# Patient Record
Sex: MALE | Race: WHITE | NOT HISPANIC OR LATINO | Employment: FULL TIME | URBAN - METROPOLITAN AREA
[De-identification: names, ages, dates, MRNs, and addresses within clinical notes are randomized per-mention and may not be internally consistent; named-entity substitution may affect disease eponyms.]

---

## 2020-12-03 ENCOUNTER — HOSPITAL ENCOUNTER (EMERGENCY)
Facility: HOSPITAL | Age: 30
Discharge: HOME | End: 2020-12-03
Attending: EMERGENCY MEDICINE | Admitting: EMERGENCY MEDICINE
Payer: COMMERCIAL

## 2020-12-03 ENCOUNTER — APPOINTMENT (EMERGENCY)
Dept: RADIOLOGY | Facility: HOSPITAL | Age: 30
End: 2020-12-03
Payer: COMMERCIAL

## 2020-12-03 VITALS
HEART RATE: 62 BPM | TEMPERATURE: 98.3 F | HEIGHT: 72 IN | WEIGHT: 180 LBS | BODY MASS INDEX: 24.38 KG/M2 | OXYGEN SATURATION: 96 % | RESPIRATION RATE: 16 BRPM | SYSTOLIC BLOOD PRESSURE: 125 MMHG | DIASTOLIC BLOOD PRESSURE: 70 MMHG

## 2020-12-03 DIAGNOSIS — R06.02 SHORTNESS OF BREATH: ICD-10-CM

## 2020-12-03 DIAGNOSIS — R53.83 FATIGUE: ICD-10-CM

## 2020-12-03 DIAGNOSIS — Z20.822 ENCOUNTER FOR LABORATORY TESTING FOR COVID-19 VIRUS: Primary | ICD-10-CM

## 2020-12-03 LAB
ALBUMIN SERPL BCP-MCNC: 4 G/DL (ref 3.5–5)
ALP SERPL-CCNC: 59 U/L (ref 46–116)
ALT SERPL W P-5'-P-CCNC: 44 U/L (ref 12–78)
ANION GAP SERPL CALCULATED.3IONS-SCNC: 4 MMOL/L (ref 4–13)
AST SERPL W P-5'-P-CCNC: 21 U/L (ref 5–45)
BASOPHILS # BLD AUTO: 0.11 THOUSANDS/ΜL (ref 0–0.1)
BASOPHILS NFR BLD AUTO: 1 % (ref 0–1)
BILIRUB SERPL-MCNC: 0.25 MG/DL (ref 0.2–1)
BUN SERPL-MCNC: 12 MG/DL (ref 5–25)
CALCIUM SERPL-MCNC: 9.2 MG/DL (ref 8.3–10.1)
CHLORIDE SERPL-SCNC: 105 MMOL/L (ref 100–108)
CK SERPL-CCNC: 128 U/L (ref 39–308)
CO2 SERPL-SCNC: 29 MMOL/L (ref 21–32)
CREAT SERPL-MCNC: 0.83 MG/DL (ref 0.6–1.3)
EOSINOPHIL # BLD AUTO: 0.36 THOUSAND/ΜL (ref 0–0.61)
EOSINOPHIL NFR BLD AUTO: 4 % (ref 0–6)
ERYTHROCYTE [DISTWIDTH] IN BLOOD BY AUTOMATED COUNT: 12.6 % (ref 11.6–15.1)
GFR SERPL CREATININE-BSD FRML MDRD: 117 ML/MIN/1.73SQ M
GLUCOSE SERPL-MCNC: 95 MG/DL (ref 65–140)
HCT VFR BLD AUTO: 46.2 % (ref 36.5–49.3)
HGB BLD-MCNC: 14.4 G/DL (ref 12–17)
IMM GRANULOCYTES # BLD AUTO: 0.03 THOUSAND/UL (ref 0–0.2)
IMM GRANULOCYTES NFR BLD AUTO: 0 % (ref 0–2)
LYMPHOCYTES # BLD AUTO: 1.44 THOUSANDS/ΜL (ref 0.6–4.47)
LYMPHOCYTES NFR BLD AUTO: 15 % (ref 14–44)
MAGNESIUM SERPL-MCNC: 2.5 MG/DL (ref 1.6–2.6)
MCH RBC QN AUTO: 29.2 PG (ref 26.8–34.3)
MCHC RBC AUTO-ENTMCNC: 31.2 G/DL (ref 31.4–37.4)
MCV RBC AUTO: 94 FL (ref 82–98)
MONOCYTES # BLD AUTO: 0.66 THOUSAND/ΜL (ref 0.17–1.22)
MONOCYTES NFR BLD AUTO: 7 % (ref 4–12)
NEUTROPHILS # BLD AUTO: 6.82 THOUSANDS/ΜL (ref 1.85–7.62)
NEUTS SEG NFR BLD AUTO: 73 % (ref 43–75)
NRBC BLD AUTO-RTO: 0 /100 WBCS
PHOSPHATE SERPL-MCNC: 3.1 MG/DL (ref 2.7–4.5)
PLATELET # BLD AUTO: 198 THOUSANDS/UL (ref 149–390)
PMV BLD AUTO: 11 FL (ref 8.9–12.7)
POTASSIUM SERPL-SCNC: 4.2 MMOL/L (ref 3.5–5.3)
PROT SERPL-MCNC: 6.9 G/DL (ref 6.4–8.2)
RBC # BLD AUTO: 4.93 MILLION/UL (ref 3.88–5.62)
SODIUM SERPL-SCNC: 138 MMOL/L (ref 136–145)
TROPONIN I SERPL-MCNC: <0.02 NG/ML
WBC # BLD AUTO: 9.42 THOUSAND/UL (ref 4.31–10.16)

## 2020-12-03 PROCEDURE — 99285 EMERGENCY DEPT VISIT HI MDM: CPT

## 2020-12-03 PROCEDURE — 82550 ASSAY OF CK (CPK): CPT | Performed by: EMERGENCY MEDICINE

## 2020-12-03 PROCEDURE — 84100 ASSAY OF PHOSPHORUS: CPT | Performed by: EMERGENCY MEDICINE

## 2020-12-03 PROCEDURE — 85025 COMPLETE CBC W/AUTO DIFF WBC: CPT | Performed by: EMERGENCY MEDICINE

## 2020-12-03 PROCEDURE — 87637 SARSCOV2&INF A&B&RSV AMP PRB: CPT | Performed by: EMERGENCY MEDICINE

## 2020-12-03 PROCEDURE — 99285 EMERGENCY DEPT VISIT HI MDM: CPT | Performed by: EMERGENCY MEDICINE

## 2020-12-03 PROCEDURE — 71045 X-RAY EXAM CHEST 1 VIEW: CPT

## 2020-12-03 PROCEDURE — 83735 ASSAY OF MAGNESIUM: CPT | Performed by: EMERGENCY MEDICINE

## 2020-12-03 PROCEDURE — 80053 COMPREHEN METABOLIC PANEL: CPT | Performed by: EMERGENCY MEDICINE

## 2020-12-03 PROCEDURE — 84484 ASSAY OF TROPONIN QUANT: CPT | Performed by: EMERGENCY MEDICINE

## 2020-12-03 PROCEDURE — 93005 ELECTROCARDIOGRAM TRACING: CPT

## 2020-12-03 PROCEDURE — 36415 COLL VENOUS BLD VENIPUNCTURE: CPT | Performed by: EMERGENCY MEDICINE

## 2020-12-03 RX ORDER — IBUPROFEN 600 MG/1
600 TABLET ORAL ONCE
Status: COMPLETED | OUTPATIENT
Start: 2020-12-03 | End: 2020-12-03

## 2020-12-03 RX ADMIN — IBUPROFEN 600 MG: 600 TABLET, FILM COATED ORAL at 21:53

## 2020-12-04 LAB
ATRIAL RATE: 63 BPM
P AXIS: 61 DEGREES
PR INTERVAL: 170 MS
QRS AXIS: 61 DEGREES
QRSD INTERVAL: 84 MS
QT INTERVAL: 390 MS
QTC INTERVAL: 399 MS
T WAVE AXIS: 4 DEGREES
VENTRICULAR RATE: 63 BPM

## 2020-12-04 PROCEDURE — 93010 ELECTROCARDIOGRAM REPORT: CPT | Performed by: INTERNAL MEDICINE

## 2020-12-08 LAB
FLUAV RNA NPH QL NAA+PROBE: NOT DETECTED
FLUBV RNA NPH QL NAA+PROBE: NOT DETECTED
RSV RNA NPH QL NAA+PROBE: NOT DETECTED
SARS-COV-2 RNA NPH QL NAA+PROBE: NOT DETECTED

## 2021-12-16 ENCOUNTER — IMMUNIZATIONS (OUTPATIENT)
Dept: FAMILY MEDICINE CLINIC | Facility: HOSPITAL | Age: 31
End: 2021-12-16

## 2021-12-16 DIAGNOSIS — Z23 ENCOUNTER FOR IMMUNIZATION: Primary | ICD-10-CM

## 2021-12-16 PROCEDURE — 91300 COVID-19 PFIZER VACC 0.3 ML: CPT

## 2021-12-16 PROCEDURE — 0001A COVID-19 PFIZER VACC 0.3 ML: CPT

## 2022-05-23 ENCOUNTER — APPOINTMENT (EMERGENCY)
Dept: RADIOLOGY | Facility: HOSPITAL | Age: 32
End: 2022-05-23
Payer: COMMERCIAL

## 2022-05-23 ENCOUNTER — HOSPITAL ENCOUNTER (EMERGENCY)
Facility: HOSPITAL | Age: 32
Discharge: HOME | End: 2022-05-24
Attending: EMERGENCY MEDICINE
Payer: COMMERCIAL

## 2022-05-23 VITALS
SYSTOLIC BLOOD PRESSURE: 114 MMHG | OXYGEN SATURATION: 99 % | HEART RATE: 74 BPM | TEMPERATURE: 98.5 F | RESPIRATION RATE: 19 BRPM | DIASTOLIC BLOOD PRESSURE: 80 MMHG

## 2022-05-23 DIAGNOSIS — R10.9 ABDOMINAL PAIN: Primary | ICD-10-CM

## 2022-05-23 DIAGNOSIS — N20.0 RIGHT RENAL STONE: ICD-10-CM

## 2022-05-23 DIAGNOSIS — R11.2 NAUSEA AND VOMITING: ICD-10-CM

## 2022-05-23 LAB
ALBUMIN SERPL BCP-MCNC: 4.2 G/DL (ref 3.5–5)
ALP SERPL-CCNC: 61 U/L (ref 46–116)
ALT SERPL W P-5'-P-CCNC: 34 U/L (ref 12–78)
ANION GAP SERPL CALCULATED.3IONS-SCNC: 1 MMOL/L (ref 4–13)
AST SERPL W P-5'-P-CCNC: 22 U/L (ref 5–45)
ATRIAL RATE: 76 BPM
BASOPHILS # BLD AUTO: 0.11 THOUSANDS/ΜL (ref 0–0.1)
BASOPHILS NFR BLD AUTO: 1 % (ref 0–1)
BILIRUB SERPL-MCNC: 0.37 MG/DL (ref 0.2–1)
BUN SERPL-MCNC: 13 MG/DL (ref 5–25)
CALCIUM SERPL-MCNC: 9.9 MG/DL (ref 8.3–10.1)
CHLORIDE SERPL-SCNC: 101 MMOL/L (ref 100–108)
CO2 SERPL-SCNC: 34 MMOL/L (ref 21–32)
CREAT SERPL-MCNC: 1.06 MG/DL (ref 0.6–1.3)
EOSINOPHIL # BLD AUTO: 0.3 THOUSAND/ΜL (ref 0–0.61)
EOSINOPHIL NFR BLD AUTO: 3 % (ref 0–6)
ERYTHROCYTE [DISTWIDTH] IN BLOOD BY AUTOMATED COUNT: 12.7 % (ref 11.6–15.1)
GFR SERPL CREATININE-BSD FRML MDRD: 91 ML/MIN/1.73SQ M
GLUCOSE SERPL-MCNC: 107 MG/DL (ref 65–140)
HCT VFR BLD AUTO: 50.1 % (ref 36.5–49.3)
HGB BLD-MCNC: 15.7 G/DL (ref 12–17)
IMM GRANULOCYTES # BLD AUTO: 0.04 THOUSAND/UL (ref 0–0.2)
IMM GRANULOCYTES NFR BLD AUTO: 0 % (ref 0–2)
LIPASE SERPL-CCNC: 67 U/L (ref 73–393)
LYMPHOCYTES # BLD AUTO: 1.17 THOUSANDS/ΜL (ref 0.6–4.47)
LYMPHOCYTES NFR BLD AUTO: 11 % (ref 14–44)
MCH RBC QN AUTO: 28.6 PG (ref 26.8–34.3)
MCHC RBC AUTO-ENTMCNC: 31.3 G/DL (ref 31.4–37.4)
MCV RBC AUTO: 91 FL (ref 82–98)
MONOCYTES # BLD AUTO: 0.7 THOUSAND/ΜL (ref 0.17–1.22)
MONOCYTES NFR BLD AUTO: 7 % (ref 4–12)
NEUTROPHILS # BLD AUTO: 8.25 THOUSANDS/ΜL (ref 1.85–7.62)
NEUTS SEG NFR BLD AUTO: 78 % (ref 43–75)
NRBC BLD AUTO-RTO: 0 /100 WBCS
P AXIS: 57 DEGREES
PLATELET # BLD AUTO: 229 THOUSANDS/UL (ref 149–390)
PMV BLD AUTO: 11.5 FL (ref 8.9–12.7)
POTASSIUM SERPL-SCNC: 4.1 MMOL/L (ref 3.5–5.3)
PR INTERVAL: 168 MS
PROT SERPL-MCNC: 7.7 G/DL (ref 6.4–8.2)
QRS AXIS: 23 DEGREES
QRSD INTERVAL: 78 MS
QT INTERVAL: 352 MS
QTC INTERVAL: 396 MS
RBC # BLD AUTO: 5.49 MILLION/UL (ref 3.88–5.62)
SODIUM SERPL-SCNC: 136 MMOL/L (ref 136–145)
T WAVE AXIS: 18 DEGREES
VENTRICULAR RATE: 76 BPM
WBC # BLD AUTO: 10.57 THOUSAND/UL (ref 4.31–10.16)

## 2022-05-23 PROCEDURE — 80053 COMPREHEN METABOLIC PANEL: CPT | Performed by: EMERGENCY MEDICINE

## 2022-05-23 PROCEDURE — 99285 EMERGENCY DEPT VISIT HI MDM: CPT | Performed by: EMERGENCY MEDICINE

## 2022-05-23 PROCEDURE — 83690 ASSAY OF LIPASE: CPT | Performed by: EMERGENCY MEDICINE

## 2022-05-23 PROCEDURE — 96375 TX/PRO/DX INJ NEW DRUG ADDON: CPT

## 2022-05-23 PROCEDURE — G1004 CDSM NDSC: HCPCS

## 2022-05-23 PROCEDURE — 36415 COLL VENOUS BLD VENIPUNCTURE: CPT | Performed by: EMERGENCY MEDICINE

## 2022-05-23 PROCEDURE — 96374 THER/PROPH/DIAG INJ IV PUSH: CPT

## 2022-05-23 PROCEDURE — 93010 ELECTROCARDIOGRAM REPORT: CPT | Performed by: INTERNAL MEDICINE

## 2022-05-23 PROCEDURE — 87636 SARSCOV2 & INF A&B AMP PRB: CPT | Performed by: EMERGENCY MEDICINE

## 2022-05-23 PROCEDURE — 93005 ELECTROCARDIOGRAM TRACING: CPT

## 2022-05-23 PROCEDURE — 99284 EMERGENCY DEPT VISIT MOD MDM: CPT

## 2022-05-23 PROCEDURE — 96361 HYDRATE IV INFUSION ADD-ON: CPT

## 2022-05-23 PROCEDURE — 85025 COMPLETE CBC W/AUTO DIFF WBC: CPT | Performed by: EMERGENCY MEDICINE

## 2022-05-23 PROCEDURE — 74176 CT ABD & PELVIS W/O CONTRAST: CPT

## 2022-05-23 RX ORDER — DICYCLOMINE HCL 20 MG
20 TABLET ORAL ONCE
Status: COMPLETED | OUTPATIENT
Start: 2022-05-23 | End: 2022-05-23

## 2022-05-23 RX ORDER — KETOROLAC TROMETHAMINE 30 MG/ML
15 INJECTION, SOLUTION INTRAMUSCULAR; INTRAVENOUS ONCE
Status: COMPLETED | OUTPATIENT
Start: 2022-05-23 | End: 2022-05-23

## 2022-05-23 RX ORDER — ONDANSETRON 2 MG/ML
4 INJECTION INTRAMUSCULAR; INTRAVENOUS ONCE
Status: COMPLETED | OUTPATIENT
Start: 2022-05-23 | End: 2022-05-23

## 2022-05-23 RX ADMIN — KETOROLAC TROMETHAMINE 15 MG: 30 INJECTION, SOLUTION INTRAMUSCULAR at 20:12

## 2022-05-23 RX ADMIN — DICYCLOMINE HYDROCHLORIDE 20 MG: 20 TABLET ORAL at 22:33

## 2022-05-23 RX ADMIN — SODIUM CHLORIDE 1000 ML: 0.9 INJECTION, SOLUTION INTRAVENOUS at 20:13

## 2022-05-23 RX ADMIN — ONDANSETRON 4 MG: 2 INJECTION INTRAMUSCULAR; INTRAVENOUS at 20:12

## 2022-05-23 NOTE — ED PROVIDER NOTES
History  Chief Complaint   Patient presents with    Abdominal Pain     Upper and lower abd pain started 5 days ago  Today started with vomiting x2  No diarrhea      35 y/o male presents to the ER for evaluation of abdominal pain with nausea and vomiting  The patient states that his symptoms started 4-5 days ago with epigastric pain that was initially intermittent, however over the last 2-3 days the pain has become more constant and is now periumbilical as well  He also reports nausea over the last 2 days and today experienced 2 episodes of nonbloody, non bilious emesis  His pain does not change with oral intake  No fever, chills, cough, shortness of breath, chest pain, diarrhea, or urinary symptoms  No history of prior abdominal diseases  None       Past Medical History:   Diagnosis Date    Anxiety        History reviewed  No pertinent surgical history  History reviewed  No pertinent family history  I have reviewed and agree with the history as documented  E-Cigarette/Vaping     E-Cigarette/Vaping Substances     Social History     Tobacco Use    Smoking status: Never Smoker    Smokeless tobacco: Never Used   Substance Use Topics    Alcohol use: Yes    Drug use: Never        Review of Systems   Constitutional: Negative for chills and fever  HENT: Negative for congestion, rhinorrhea and sore throat  Respiratory: Negative for cough and shortness of breath  Cardiovascular: Negative for chest pain and palpitations  Gastrointestinal: Positive for abdominal pain, nausea and vomiting  Negative for diarrhea  Genitourinary: Negative for dysuria and hematuria  Musculoskeletal: Negative for back pain and neck pain  Neurological: Negative for weakness, light-headedness, numbness and headaches  All other systems reviewed and are negative        Physical Exam  ED Triage Vitals [05/23/22 1843]   Temperature Pulse Respirations Blood Pressure SpO2   98 5 °F (36 9 °C) 90 16 (!) 172/96 100 % Temp Source Heart Rate Source Patient Position - Orthostatic VS BP Location FiO2 (%)   Temporal Monitor Sitting Left arm --      Pain Score       8             Orthostatic Vital Signs  Vitals:    05/23/22 2100 05/23/22 2130 05/23/22 2200 05/23/22 2230   BP: 119/57 112/61 113/59 114/80   Pulse: 76 72 70 74   Patient Position - Orthostatic VS: Lying Sitting Sitting Sitting       Physical Exam  Vitals and nursing note reviewed  Constitutional:       General: He is not in acute distress  Appearance: Normal appearance  He is well-developed and normal weight  He is not ill-appearing  HENT:      Head: Normocephalic and atraumatic  Right Ear: External ear normal       Left Ear: External ear normal       Nose: Nose normal       Mouth/Throat:      Mouth: Mucous membranes are moist       Pharynx: Oropharynx is clear  No oropharyngeal exudate or posterior oropharyngeal erythema  Eyes:      Extraocular Movements: Extraocular movements intact  Conjunctiva/sclera: Conjunctivae normal       Pupils: Pupils are equal, round, and reactive to light  Cardiovascular:      Rate and Rhythm: Normal rate and regular rhythm  Pulses: Normal pulses  Heart sounds: Normal heart sounds  Pulmonary:      Effort: Pulmonary effort is normal  No respiratory distress  Breath sounds: Normal breath sounds  No wheezing or rales  Abdominal:      General: Abdomen is flat  Bowel sounds are normal  There is no distension  Palpations: Abdomen is soft  Tenderness: There is generalized abdominal tenderness  There is right CVA tenderness and left CVA tenderness  There is no guarding  Comments: Mild generalized abdominal tenderness and mild bilateral CVA tenderness on exam    Musculoskeletal:         General: No swelling or tenderness  Normal range of motion  Cervical back: Normal range of motion and neck supple  No tenderness  Skin:     General: Skin is warm and dry     Neurological:      General: No focal deficit present  Mental Status: He is alert and oriented to person, place, and time           ED Medications  Medications   ondansetron (ZOFRAN) injection 4 mg (4 mg Intravenous Given 5/23/22 2012)   sodium chloride 0 9 % bolus 1,000 mL (0 mL Intravenous Stopped 5/23/22 2119)   ketorolac (TORADOL) injection 15 mg (15 mg Intravenous Given 5/23/22 2012)   dicyclomine (BENTYL) tablet 20 mg (20 mg Oral Given 5/23/22 2233)       Diagnostic Studies  Results Reviewed     Procedure Component Value Units Date/Time    Comprehensive metabolic panel [[id number]]  (Abnormal) Collected: 05/23/22 2012    Lab Status: Final result Specimen: Blood from Arm, Right Updated: 05/23/22 2046     Sodium 136 mmol/L      Potassium 4 1 mmol/L      Chloride 101 mmol/L      CO2 34 mmol/L      ANION GAP 1 mmol/L      BUN 13 mg/dL      Creatinine 1 06 mg/dL      Glucose 107 mg/dL      Calcium 9 9 mg/dL      AST 22 U/L      ALT 34 U/L      Alkaline Phosphatase 61 U/L      Total Protein 7 7 g/dL      Albumin 4 2 g/dL      Total Bilirubin 0 37 mg/dL      eGFR 91 ml/min/1 73sq m     Narrative:      [place] guidelines for Chronic Kidney Disease (CKD):     Stage 1 with normal or high GFR (GFR > 90 mL/min/1 73 square meters)    Stage 2 Mild CKD (GFR = 60-89 mL/min/1 73 square meters)    Stage 3A Moderate CKD (GFR = 45-59 mL/min/1 73 square meters)    Stage 3B Moderate CKD (GFR = 30-44 mL/min/1 73 square meters)    Stage 4 Severe CKD (GFR = 15-29 mL/min/1 73 square meters)    Stage 5 End Stage CKD (GFR <15 mL/min/1 73 square meters)  Note: GFR calculation is accurate only with a steady state creatinine    Lipase [[id number]]  (Abnormal) Collected: 05/23/22 2012    Lab Status: Final result Specimen: Blood from Arm, Right Updated: 05/23/22 2045     Lipase 67 u/L     CBC and differential [[id number]]  (Abnormal) Collected: 05/23/22 2012    Lab Status: Final result Specimen: Blood from Arm, Right Updated: 05/23/22 2026     WBC 10 57 Thousand/uL      RBC 5 49 Million/uL      Hemoglobin 15 7 g/dL      Hematocrit 50 1 %      MCV 91 fL      MCH 28 6 pg      MCHC 31 3 g/dL      RDW 12 7 %      MPV 11 5 fL      Platelets 730 Thousands/uL      nRBC 0 /100 WBCs      Neutrophils Relative 78 %      Immat GRANS % 0 %      Lymphocytes Relative 11 %      Monocytes Relative 7 %      Eosinophils Relative 3 %      Basophils Relative 1 %      Neutrophils Absolute 8 25 Thousands/µL      Immature Grans Absolute 0 04 Thousand/uL      Lymphocytes Absolute 1 17 Thousands/µL      Monocytes Absolute 0 70 Thousand/µL      Eosinophils Absolute 0 30 Thousand/µL      Basophils Absolute 0 11 Thousands/µL     COVID/FLU - 24 hour TAT [[id number]] Collected: 05/23/22 2012    Lab Status: In process Specimen: Nares from Nose Updated: 05/23/22 2021                 CT abdomen pelvis wo contrast   Final Result by Scar Rock DO (05/23 2324)      Tiny nonobstructing stone in the lower pole the right kidney  No hydronephrosis  No acute process seen  Workstation performed: [id number]               Procedures  Procedures      ED Course  ED Course as of 05/24/22 0054   Mon May 23, 2022   2015 Procedure Note: EKG  Date/Time: 05/23/22 8:08 PM   Interpreted by: Marci Mccormack MD  Indications / Diagnosis:  Epigastric pain  ECG reviewed by me, the ED Physician: yes   The EKG demonstrates:  Rhythm: normal sinus 76 bpm  Intervals: normal intervals  Axis: normal axis  QRS/Blocks: low voltage QRS  ST Changes: No acute ST Changes, no STD/EDWIGE  No significant change compared to ECG from 12/03/2020    2349 CT abdomen pelvis wo contrast                                       MDM  Number of Diagnoses or Management Options  Abdominal pain  Nausea and vomiting  Right renal stone  Diagnosis management comments: This is a 33 y/o male presenting for evaluation of abdominal pain with nausea and vomiting    The patient states that his symptoms started 4-5 days ago with epigastric pain that was initially intermittent, however over the last 2-3 days the pain has become more constant and is now periumbilical as well  He also reports nausea over the last 2 days and today experienced 2 episodes of nonbloody, non bilious emesis  His pain does not change with oral intake  No fever, diarrhea, or urinary symptoms  On exam the patient is afebrile, VSS, appears uncomfortable but no acute distress  Heart with regular rate and rhythm, lungs are clear to auscultation bilaterally  Abdomen is soft with mild generalized tenderness as well as mild bilateral CVA tenderness  Will check CBC, CMP, lipase, and CTA/P  Labs reassuring  Viral testing also performed for COVID/flu, results are pending  CT showed tiny nonobstructing right renal calculus in the inferior pole  Suspect viral GI illness  Symptomatic treatment  Discussed all findings, treatment, red flags/return precautions, and outpatient follow-up and the patient/family understands and agrees  Stable for discharge  Disposition  Final diagnoses:   Abdominal pain   Nausea and vomiting   Right renal stone     Time reflects when diagnosis was documented in both MDM as applicable and the Disposition within this note     Time User Action Codes Description Comment    5/24/2022 12:00 AM Shelizzettea Mascot Add [R10 9] Abdominal pain     5/24/2022 12:00 AM Sheilda Mascot Add [R11 2] Nausea and vomiting     5/24/2022 12:02 AM Sheilda Mascot Add [N20 0] Right renal stone       ED Disposition     ED Disposition   Discharge    Condition   Stable    Date/Time   Tue May 24, 2022 12:01 AM    Comment   Mini Summers discharge to home/self care                 Follow-up Information     Follow up With Specialties Details Why Contact Info Additional 94 [facility] Internal Medicine Call  As needed [address] [facility] [address]  [facility] [address], [address] [facility] [address], East, [place], [place], [address] [phone]    Infolink  Call  As needed [address] Emergency Department Emergency Medicine Go to  If symptoms worsen [address]  [address] [facility] [address] East Emergency Department, 600 [facility] 20, [place], [place], [address]    Florentin Pink [facility] [place] [facility] Call  As needed [address] [facility] [address] [facility] [address]  Cecilio Felix [address] [facility] [place], [address] [place] [address] [address], [place], [place], [address]          Discharge Medication List as of 5/24/2022 12:07 AM      START taking these medications    Details   ondansetron (ZOFRAN-ODT) 8 mg disintegrating tablet Take 1 tablet (8 mg total) by mouth every 8 (eight) hours as needed for nausea or vomiting, Starting Tue 5/24/2022, Normal      sucralfate (CARAFATE) 1 g tablet Take 1 tablet (1 g total) by mouth as needed in the morning and 1 tablet (1 g total) as needed at noon and 1 tablet (1 g total) as needed in the evening and 1 tablet (1 g total) as needed before bedtime (Abdominal pain)  Do all this for up to 10 days  , S tarting Tue 5/24/2022, Until Fri 6/3/2022 at 2359, Normal           No discharge procedures on file  PDMP Review     None           ED Provider  Attending physically available and evaluated [employer]  I managed the patient along with the ED Attending      Electronically Signed by         Joanna Jeong MD  05/24/22 5957

## 2022-05-24 LAB
FLUAV RNA RESP QL NAA+PROBE: NEGATIVE
FLUBV RNA RESP QL NAA+PROBE: NEGATIVE
SARS-COV-2 RNA RESP QL NAA+PROBE: NEGATIVE

## 2022-05-24 RX ORDER — ONDANSETRON 8 MG/1
8 TABLET, ORALLY DISINTEGRATING ORAL EVERY 8 HOURS PRN
Qty: 20 TABLET | Refills: 0 | Status: SHIPPED | OUTPATIENT
Start: 2022-05-24

## 2022-05-24 RX ORDER — SUCRALFATE 1 G/1
1 TABLET ORAL 4 TIMES DAILY PRN
Qty: 40 TABLET | Refills: 0 | Status: SHIPPED | OUTPATIENT
Start: 2022-05-24 | End: 2022-06-03

## 2022-05-24 NOTE — ED ATTENDING ATTESTATION
Final Diagnosis:  1  Abdominal pain    2  Nausea and vomiting    3  Right renal stone           Moy SANDVOAL MD, saw and evaluated the patient  All available labs and X-rays were ordered by me or the resident and have been reviewed by myself  I discussed the patient with the resident / non-physician and agree with the resident's / non-physician practitioner's findings and plan as documented in the resident's / non-physician practicitioner's note, except where noted  At this point, I agree with the current assessment done in the ED  I was present during key portions of all procedures performed unless otherwise stated  Chief Complaint   Patient presents with    Abdominal Pain     Upper and lower abd pain started 5 days ago  Today started with vomiting x2  No diarrhea      This is a 34 y o  male presenting for evaluation of abdominal pain  Patient states that beginning for 5 days ago he started to have this discomfort any mid epigastric region, no associated fevers chills nausea vomiting chest pain shortness of breath  No change in stool, denies any melanotic stools or black colored stools  Denies any dizziness or lightheadedness  Denies history of similar  Does take Motrin to help with symptoms and uses Motrin daily, about 400 mg q day altogether  Also tried Pepto-Bismol to help symptoms  Denies any urinary symptoms including burning itching pain blood frequency  No cough congestion  He has maybe 2 beers a week, does not drink regularly, no history of pancreatitis  Denies any belly surgeries, only having a patent ductus arteriosus surgery at some point  Denies any major medical problems apart from reflux, no recent endoscopy  Denies smoking or drug use  No recent travel  No sick contacts  No history of similar  PMH:   has a past medical history of Anxiety  PSH:   has no past surgical history on file      Social:  Social History     Substance and Sexual Activity   Alcohol Use Yes Social History     Tobacco Use   Smoking Status Never Smoker   Smokeless Tobacco Never Used     Social History     Substance and Sexual Activity   Drug Use Never     PE:  Vitals:    05/23/22 2100 05/23/22 2130 05/23/22 2200 05/23/22 2230   BP: 119/57 112/61 113/59 114/80   BP Location: Right arm Right arm Right arm Right arm   Pulse: 76 72 70 74   Resp: 18 21 15 19   Temp:       TempSrc:       SpO2: 99% 99% 99% 99%   General: VSS, NAD, awake, alert  Well-nourished, well-developed  Appears stated age  Head: Normocephalic, atraumatic, nontender  Eyes: PERRL, EOM-I  No diplopia  No hyphema  No subconjunctival hemorrhages  Symmetrical lids  ENTAtraumatic external nose and ears  MMM  No stridor  Normal phonation  No drooling  Base of mouth is soft  No mastoid tenderness  Neck: Symmetric, trachea midline  No JVD  CV: Peripheral pulses +2 throughout  No chest wall tenderness  Lungs:   Unlabored   No retractions  No crepitus  No tachypnea  No paradoxical motion  Abd: +BS, soft, epigastric tenderness focally  Rest of belly is tender but not as severe  ND  No guarding  No rigidity  No rebound  MSK:   FROM   No lower extremity edema  Back:   No CVAT  Skin: Dry, intact  Neuro: AAOx3, GCS 15, CN II-XII grossly intact  Motor grossly intact  Psychiatric/Behavioral: Appropriate mood and affect   Exam: deferred  A:  - Belly pain  P:  - labs  - fluids  - CT    - 13 point ROS was performed and all are normal unless stated in the history above  - Nursing note reviewed  Vitals reviewed  - Orders placed by myself and/or advanced practitioner / resident     - Previous chart was reviewed  - No language barrier    - History obtained from patient  - There are no limitations to the history obtained  - Critical care time: Not applicable for this patient       Code Status: No Order  Advance Directive and Living Will:      Power of :    POLST:      Medications   ondansetron (Miguel Asper) injection 4 mg (4 mg Intravenous Given 5/23/22 2012)   sodium chloride 0 9 % bolus 1,000 mL (0 mL Intravenous Stopped 5/23/22 2119)   ketorolac (TORADOL) injection 15 mg (15 mg Intravenous Given 5/23/22 2012)   dicyclomine (BENTYL) tablet 20 mg (20 mg Oral Given 5/23/22 2233)     CT abdomen pelvis wo contrast   Final Result      Tiny nonobstructing stone in the lower pole the right kidney  No hydronephrosis  No acute process seen        Workstation performed: [id number]           Orders Placed This Encounter   Procedures    COVID/FLU - 24 hour TAT    CT abdomen pelvis wo contrast    CBC and differential    Comprehensive metabolic panel    Lipase    ECG 12 lead    ECG 12 lead     Labs Reviewed   CBC AND DIFFERENTIAL - Abnormal       Result Value Ref Range Status    WBC 10 57 (*) 4 31 - 10 16 Thousand/uL Final    RBC 5 49  3 88 - 5 62 Million/uL Final    Hemoglobin 15 7  12 0 - 17 0 g/dL Final    Hematocrit 50 1 (*) 36 5 - 49 3 % Final    MCV 91  82 - 98 fL Final    MCH 28 6  26 8 - 34 3 pg Final    MCHC 31 3 (*) 31 4 - 37 4 g/dL Final    RDW 12 7  11 6 - 15 1 % Final    MPV 11 5  8 9 - 12 7 fL Final    Platelets 516  268 - 390 Thousands/uL Final    nRBC 0  /100 WBCs Final    Neutrophils Relative 78 (*) 43 - 75 % Final    Immat GRANS % 0  0 - 2 % Final    Lymphocytes Relative 11 (*) 14 - 44 % Final    Monocytes Relative 7  4 - 12 % Final    Eosinophils Relative 3  0 - 6 % Final    Basophils Relative 1  0 - 1 % Final    Neutrophils Absolute 8 25 (*) 1 85 - 7 62 Thousands/µL Final    Immature Grans Absolute 0 04  0 00 - 0 20 Thousand/uL Final    Lymphocytes Absolute 1 17  0 60 - 4 47 Thousands/µL Final    Monocytes Absolute 0 70  0 17 - 1 22 Thousand/µL Final    Eosinophils Absolute 0 30  0 00 - 0 61 Thousand/µL Final    Basophils Absolute 0 11 (*) 0 00 - 0 10 Thousands/µL Final   COMPREHENSIVE METABOLIC PANEL - Abnormal    Sodium 136  136 - 145 mmol/L Final    Potassium 4 1  3 5 - 5 3 mmol/L Final    Chloride 101 100 - 108 mmol/L Final    CO2 34 (*) 21 - 32 mmol/L Final    ANION GAP 1 (*) 4 - 13 mmol/L Final    BUN 13  5 - 25 mg/dL Final    Creatinine 1 06  0 60 - 1 30 mg/dL Final    Comment: Standardized to IDMS reference method    Glucose 107  65 - 140 mg/dL Final    Comment: If the patient is fasting, the ADA then defines impaired fasting glucose as > 100 mg/dL and diabetes as > or equal to 123 mg/dL  Specimen collection should occur prior to Sulfasalazine administration due to the potential for falsely depressed results  Specimen collection should occur prior to Sulfapyridine administration due to the potential for falsely elevated results  Calcium 9 9  8 3 - 10 1 mg/dL Final    AST 22  5 - 45 U/L Final    Comment: Specimen collection should occur prior to Sulfasalazine administration due to the potential for falsely depressed results  ALT 34  12 - 78 U/L Final    Comment: Specimen collection should occur prior to Sulfasalazine and/or Sulfapyridine administration due to the potential for falsely depressed results  Alkaline Phosphatase 61  46 - 116 U/L Final    Total Protein 7 7  6 4 - 8 2 g/dL Final    Albumin 4 2  3 5 - 5 0 g/dL Final    Total Bilirubin 0 37  0 20 - 1 00 mg/dL Final    Comment: Use of this assay is not recommended for patients undergoing treatment with eltrombopag due to the potential for falsely elevated results      eGFR 91  ml/min/1 73sq m Final    Narrative:     [place] guidelines for Chronic Kidney Disease (CKD):     Stage 1 with normal or high GFR (GFR > 90 mL/min/1 73 square meters)    Stage 2 Mild CKD (GFR = 60-89 mL/min/1 73 square meters)    Stage 3A Moderate CKD (GFR = 45-59 mL/min/1 73 square meters)    Stage 3B Moderate CKD (GFR = 30-44 mL/min/1 73 square meters)    Stage 4 Severe CKD (GFR = 15-29 mL/min/1 73 square meters)    Stage 5 End Stage CKD (GFR <15 mL/min/1 73 square meters)  Note: GFR calculation is accurate only with a steady state creatinine LIPASE - Abnormal    Lipase 67 (*) 73 - 393 u/L Final     Time reflects when diagnosis was documented in both MDM as applicable and the Disposition within this note       Time User Action Codes Description Comment    5/24/2022 12:00 AM Pervis Muss Add [R10 9] Abdominal pain     5/24/2022 12:00 AM Pervis Muss Add [R11 2] Nausea and vomiting     5/24/2022 12:02 AM Pervis Muss Add [N20 0] Right renal stone           ED Disposition       ED Disposition   Discharge    Condition   Stable    Date/Time   Tue May 24, 2022 12:01 AM    Comment   Larisa Snow discharge to home/self care                     Follow-up Information       Follow up With Specialties Details Why Contact Info Additional [address] Internal Medicine Call  As needed [address] [facility]  [address] [facility] [address]  [facility] [address], [address] [facility] 80, East, [place], [place], [address] [phone]    Infolink  Call  As needed [address] Emergency Department Emergency Medicine Go to  If symptoms worsen [address]  [address] [facility] 64 [facility] Emergency Department, [address], [place], [place], [facility]    [facility] Call  As needed [address]  [address] [facility] [address]  Cecilio Felix [address] [facility], [address], [place], [place], [address]          Discharge Medication List as of 5/24/2022 12:07 AM        START taking these medications    Details   ondansetron (ZOFRAN-ODT) 8 mg disintegrating tablet Take 1 tablet (8 mg total) by mouth every 8 (eight) hours as needed for nausea or vomiting, Starting Tue 5/24/2022, Normal      sucralfate (CARAFATE) 1 g tablet Take 1 tablet (1 g total) by mouth as needed in the morning and 1 tablet (1 g total) as needed at noon and 1 tablet (1 g total) as needed in the evening and 1 tablet (1 g total) as needed before bedtime (Abdominal pain)  Do all this for up to 10 days  , S tarting Tue 5/24/2022, Until Fri 6/3/2022 at 2359, Normal           No discharge procedures on file  None       Portions of the record may have been created with voice recognition software  Occasional wrong word or "sound a like" substitutions may have occurred due to the inherent limitations of voice recognition software  Read the chart carefully and recognize, using context, where substitutions have occurred      Electronically signed by:  Leyda Merritt

## 2022-11-27 ENCOUNTER — OFFICE VISIT (OUTPATIENT)
Dept: URGENT CARE | Facility: MEDICAL CENTER | Age: 32
End: 2022-11-27

## 2022-11-27 VITALS
HEART RATE: 85 BPM | RESPIRATION RATE: 18 BRPM | TEMPERATURE: 98.6 F | DIASTOLIC BLOOD PRESSURE: 87 MMHG | SYSTOLIC BLOOD PRESSURE: 141 MMHG | OXYGEN SATURATION: 98 %

## 2022-11-27 DIAGNOSIS — J01.00 ACUTE NON-RECURRENT MAXILLARY SINUSITIS: Primary | ICD-10-CM

## 2022-11-27 RX ORDER — AZITHROMYCIN 250 MG/1
TABLET, FILM COATED ORAL
Qty: 6 TABLET | Refills: 0 | Status: SHIPPED | OUTPATIENT
Start: 2022-11-27 | End: 2022-12-01

## 2022-11-27 NOTE — PROGRESS NOTES
[address] [employer] [facility]        NAME: Payton Lesch is a 34 y o  male  : 1990    MRN: [id number]  DATE: 2022  TIME: 8:46 AM    Assessment and Plan   Acute non-recurrent maxillary sinusitis [J01 00]  1  Acute non-recurrent maxillary sinusitis  azithromycin (ZITHROMAX) 250 mg tablet        Prescribed antibiotic for acute sinusitis due to symptoms and history of symptoms getting better then worse again  Take antibiotic as directed  Follow up with PCP  Fever/Body Aches: OTC Tylenol, ibuprofen, motrin as directed  Cough: OTC Robitussin or Delsym cough syrup  Sore Throat: Warm saltwater gargles, honey, drink plenty of liquids, soft foods  If severe, can utilize OTC chloraseptic spray  Nasal Congestion: OTC saline nasal spray use as directed, OTC decongestants such as Sudafed  Patient Instructions       Follow up with PCP in 3-5 days  Proceed to  ER if symptoms worsen  Chief Complaint     Chief Complaint   Patient presents with   • Cold Like Symptoms     Chest and head congestion, fatigue, cough with body aches  Patient did do at home Covid test that was  it was negative  Patient states he gets days where he feels better than symptoms start to increase again  History of Present Illness       Symptoms started a little less than 2 weeks ago with some chest tightness, congestion  This got better and a few days later he started again with nasal congestion, PND with coughing up of pink-tinged mucus, feverish feeling, sore throat, shortness of breath, and some nausea  His symptoms have gotten worse over the past few days  He also has been experiencing muscle aches  He has had sick contacts at work  Patient has tried Dayquil, Nyquil, aleksander seltzer cold and flu, Theraflu, Advil with mild relief  Patient has a history of asthma  Review of Systems   Review of Systems   Constitutional: Positive for appetite change (Decreased), chills, fatigue and fever (Feels feverish off and on)  HENT: Positive for congestion, postnasal drip, rhinorrhea, sinus pressure and sore throat (Burning with breathing)  Negative for ear discharge, ear pain and sinus pain  Eyes: Positive for discharge (Watery, burning)  Negative for pain, redness and itching  Respiratory: Positive for chest tightness and shortness of breath  Negative for cough and wheezing  Gastrointestinal: Positive for abdominal pain (Generalized discomfort), diarrhea and nausea  Negative for vomiting  Musculoskeletal: Positive for myalgias  Current Medications       Current Outpatient Medications:   •  azithromycin (ZITHROMAX) 250 mg tablet, Take 2 tablets today then 1 tablet daily x 4 days, Disp: 6 tablet, Rfl: 0  •  ondansetron (ZOFRAN-ODT) 8 mg disintegrating tablet, Take 1 tablet (8 mg total) by mouth every 8 (eight) hours as needed for nausea or vomiting, Disp: 20 tablet, Rfl: 0  •  sucralfate (CARAFATE) 1 g tablet, Take 1 tablet (1 g total) by mouth as needed in the morning and 1 tablet (1 g total) as needed at noon and 1 tablet (1 g total) as needed in the evening and 1 tablet (1 g total) as needed before bedtime (Abdominal pain)  Do all this for up to 10 days  , Disp: 40 tablet, Rfl: 0    Current Allergies     Allergies as of 11/27/2022 - Reviewed 11/27/2022   Allergen Reaction Noted   • Bee venom  12/03/2020            The following portions of the patient's history were reviewed and updated as appropriate: allergies, current medications, past family history, past medical history, past social history, past surgical history and problem list      Past Medical History:   Diagnosis Date   • Anxiety        History reviewed  No pertinent surgical history  History reviewed  No pertinent family history  Medications have been verified  Objective   /87   Pulse 85   Temp 98 6 °F (37 °C)   Resp 18   SpO2 98%        Physical Exam     Physical Exam  Vitals and nursing note reviewed     Constitutional: General: He is not in acute distress  Appearance: Normal appearance  He is not ill-appearing  HENT:      Right Ear: Tympanic membrane, ear canal and external ear normal       Left Ear: Tympanic membrane, ear canal and external ear normal       Nose: Congestion and rhinorrhea present  Right Sinus: Maxillary sinus tenderness present  No frontal sinus tenderness  Left Sinus: Maxillary sinus tenderness present  No frontal sinus tenderness  Mouth/Throat:      Mouth: Mucous membranes are moist       Tongue: No lesions  Palate: No lesions  Pharynx: Posterior oropharyngeal erythema present  No pharyngeal swelling or oropharyngeal exudate  Tonsils: No tonsillar exudate  Comments: Tonsil stone noted in the left tonsil  Eyes:      General:         Right eye: No discharge  Left eye: No discharge  Extraocular Movements: Extraocular movements intact  Conjunctiva/sclera: Conjunctivae normal       Pupils: Pupils are equal, round, and reactive to light  Cardiovascular:      Rate and Rhythm: Normal rate and regular rhythm  Pulses: Normal pulses  Heart sounds: Normal heart sounds  Pulmonary:      Effort: Pulmonary effort is normal  No respiratory distress  Breath sounds: Normal breath sounds  No wheezing, rhonchi or rales  Abdominal:      General: Abdomen is flat  Bowel sounds are normal  There is no distension  Palpations: Abdomen is soft  Tenderness: There is no abdominal tenderness  There is no guarding  Musculoskeletal:      Cervical back: Neck supple  Lymphadenopathy:      Cervical: No cervical adenopathy  Skin:     General: Skin is warm and dry  Neurological:      Mental Status: He is alert

## 2022-11-27 NOTE — PATIENT INSTRUCTIONS
Prescribed antibiotic for acute sinusitis due to symptoms and history of symptoms getting better then worse again  Take antibiotic as directed  Fever/Body Aches: OTC Tylenol, ibuprofen, motrin as directed  Cough: OTC Robitussin or Delsym cough syrup  Sore Throat: Warm saltwater gargles, honey, drink plenty of liquids, soft foods  If severe, can utilize OTC chloraseptic spray  Nasal Congestion: OTC saline nasal spray use as directed, OTC decongestants such as Sudafed  Sinusitis   AMBULATORY CARE:   Sinusitis  is inflammation or infection of your sinuses  Sinusitis is most often caused by a virus  Acute sinusitis may last up to 12 weeks  Chronic sinusitis lasts longer than 12 weeks  Recurrent sinusitis means you have 4 or more infections in 1 year  Common signs and symptoms:   Fever    Pain, pressure, redness, or swelling around the forehead, cheeks, or eyes    Thick yellow or green discharge from your nose    Tenderness when you touch your face over your sinuses    Dry cough that happens mostly at night or when you lie down    Headache and face pain that is worse when you lean forward    Tooth pain, or pain when you chew    Seek care immediately if:   You have trouble breathing or wheezing that is getting worse  You have a stiff neck, a fever, or a bad headache  You cannot open your eye  Your eyeball bulges out or you cannot move your eye  You are more sleepy than normal, or you notice changes in your ability to think, move, or talk  You have swelling of your forehead or scalp  Call your doctor if:   You have vision changes, such as double vision  Your eye and eyelid are red, swollen, and painful  Your symptoms do not improve or go away after 10 days  You have nausea and are vomiting  Your nose is bleeding  You have questions or concerns about your condition or care  Medicines: Your symptoms may go away on their own   Your healthcare provider may recommend watchful waiting for up to 10 days before starting antibiotics  You may need any of the following:  Acetaminophen  decreases pain and fever  It is available without a doctor's order  Ask how much to take and how often to take it  Follow directions  Read the labels of all other medicines you are using to see if they also contain acetaminophen, or ask your doctor or pharmacist  Acetaminophen can cause liver damage if not taken correctly  Do not use more than 4 grams (4,000 milligrams) total of acetaminophen in one day  NSAIDs , such as ibuprofen, help decrease swelling, pain, and fever  This medicine is available with or without a doctor's order  NSAIDs can cause stomach bleeding or kidney problems in certain people  If you take blood thinner medicine, always ask your healthcare provider if NSAIDs are safe for you  Always read the medicine label and follow directions  Nasal steroid sprays  may help decrease inflammation in your nose and sinuses  Decongestants  help reduce swelling and drain mucus in the nose and sinuses  They may help you breathe easier  Antihistamines  help dry mucus in the nose and relieve sneezing  Antibiotics  help treat or prevent a bacterial infection  Self-care:   Rinse your sinuses as directed  Use a sinus rinse device to rinse your nasal passages with a saline (salt water) solution or distilled water  Do not use tap water  This will help thin the mucus in your nose and rinse away pollen and dirt  It will also help reduce swelling so you can breathe normally  Use a humidifier  to increase air moisture in your home  This may make it easier for you to breathe and help decrease your cough  Sleep with your head elevated  Place an extra pillow under your head before you go to sleep to help your sinuses drain  Drink liquids as directed  Ask your healthcare provider how much liquid to drink each day and which liquids are best for you   Liquids will thin the mucus in your nose and help it drain  Avoid drinks that contain alcohol or caffeine  Do not smoke, and avoid secondhand smoke  Nicotine and other chemicals in cigarettes and cigars can make your symptoms worse  Ask your healthcare provider for information if you currently smoke and need help to quit  E-cigarettes or smokeless tobacco still contain nicotine  Talk to your healthcare provider before you use these products  Prevent the spread of germs:   Wash your hands often with soap and water  Wash your hands after you use the bathroom, change a child's diaper, or sneeze  Wash your hands before you prepare or eat food  Stay away from people who are sick  Some germs spread easily and quickly through contact  Follow up with your doctor as directed: You may be referred to an ear, nose, and throat specialist  Write down your questions so you remember to ask them during your visits  © Copyright [employer] 2022 Information is for End User's use only and may not be sold, redistributed or otherwise used for commercial purposes  All illustrations and images included in CareNotes® are the copyrighted property of [employer]  or [facility] Indira Arteaga   The above information is an  only  It is not intended as medical advice for individual conditions or treatments  Talk to your doctor, nurse or pharmacist before following any medical regimen to see if it is safe and effective for you

## 2023-04-18 PROBLEM — R20.2 NUMBNESS AND TINGLING: Status: ACTIVE | Noted: 2023-04-18

## 2023-04-18 PROBLEM — E78.5 HYPERLIPIDEMIA: Status: ACTIVE | Noted: 2023-04-18

## 2023-04-18 PROBLEM — R20.0 NUMBNESS AND TINGLING: Status: ACTIVE | Noted: 2023-04-18

## 2023-05-02 ENCOUNTER — OFFICE VISIT (OUTPATIENT)
Dept: FAMILY MEDICINE CLINIC | Facility: CLINIC | Age: 33
End: 2023-05-02

## 2023-05-02 VITALS
HEIGHT: 70 IN | SYSTOLIC BLOOD PRESSURE: 122 MMHG | HEART RATE: 95 BPM | TEMPERATURE: 98.7 F | OXYGEN SATURATION: 99 % | BODY MASS INDEX: 25.91 KG/M2 | WEIGHT: 181 LBS | DIASTOLIC BLOOD PRESSURE: 80 MMHG

## 2023-05-02 DIAGNOSIS — R20.2 NUMBNESS AND TINGLING: ICD-10-CM

## 2023-05-02 DIAGNOSIS — R20.0 NUMBNESS AND TINGLING: ICD-10-CM

## 2023-05-02 DIAGNOSIS — E78.5 HYPERLIPIDEMIA, UNSPECIFIED HYPERLIPIDEMIA TYPE: ICD-10-CM

## 2023-05-02 DIAGNOSIS — F41.9 ANXIETY: ICD-10-CM

## 2023-05-02 DIAGNOSIS — E83.52 HYPERCALCEMIA: Primary | ICD-10-CM

## 2023-05-02 RX ORDER — ROSUVASTATIN CALCIUM 5 MG/1
5 TABLET, COATED ORAL DAILY
Qty: 90 TABLET | Refills: 1 | Status: SHIPPED | OUTPATIENT
Start: 2023-05-02

## 2023-05-02 NOTE — PATIENT INSTRUCTIONS
1  Nausea    2  Hypercalcemia  -     PTH, intact; Future    3  Numbness and tingling  -     Ambulatory Referral to Neurology; Future    4  Anxiety     Try tums for relief of GERD

## 2023-05-02 NOTE — PROGRESS NOTES
Assessment/Plan:       Problem List Items Addressed This Visit        Other    Numbness and tingling     Will recheck calcium and check PTH, referred to neurology today         Relevant Orders    Ambulatory Referral to Neurology    Hyperlipidemia     Start on crestor, recheck discussed importance of exercise today         Relevant Medications    rosuvastatin (CRESTOR) 5 mg tablet    Anxiety     Consider treatment if no other cause is found for symptoms, continue work up of numbness with PTH, calcium and will consult neurology        Other Visit Diagnoses     Hypercalcemia    -  Primary    Relevant Orders    PTH, intact    Calcium            Subjective:      Patient ID: Kimberly Carcamo is a 34 y o  male  HPI     34year old presenting for continued numbness and tingling  Similar symptoms to last visit, worse on left side, has been present for about one month  Symptoms have not worsened since last visit remains unchanged  Does feel weakness on left side intermittently  Lab work showed slightly increased calcium otherwise was normal     Does report being anxious, was on an anxiety medication in [place], currently declines anxiety treatment          The following portions of the patient's history were reviewed and updated as appropriate: allergies, current medications, past family history, past medical history, past social history, past surgical history and problem list       Current Outpatient Medications:     albuterol (PROVENTIL HFA,VENTOLIN HFA) 90 mcg/act inhaler, Inhale 2 puffs every 6 (six) hours as needed, Disp: , Rfl:     hydrOXYzine HCL (ATARAX) 25 mg tablet, Take 1 tablet (25 mg total) by mouth every 6 (six) hours as needed for itching or anxiety, Disp: 30 tablet, Rfl: 1    loratadine (CLARITIN) 10 mg tablet, Take 10 mg by mouth daily, Disp: , Rfl:     ondansetron (ZOFRAN-ODT) 8 mg disintegrating tablet, Take 1 tablet (8 mg total) by mouth every 8 (eight) hours as needed for nausea or vomiting, "Disp: 20 tablet, Rfl: 0    rosuvastatin (CRESTOR) 5 mg tablet, Take 1 tablet (5 mg total) by mouth daily, Disp: 90 tablet, Rfl: 1     Review of Systems   Constitutional: Positive for activity change and appetite change  Negative for fatigue and fever  HENT: Negative for congestion and dental problem  Respiratory: Negative for apnea, chest tightness and shortness of breath  Cardiovascular: Negative for chest pain and palpitations  Gastrointestinal: Positive for nausea  Negative for abdominal distention  Musculoskeletal: Negative for arthralgias and back pain  Neurological: Negative for dizziness and facial asymmetry  Psychiatric/Behavioral: Negative for agitation and behavioral problems  The patient is nervous/anxious  Objective:      /80 (BP Location: Left arm, Patient Position: Sitting, Cuff Size: Standard)   Pulse 95   Temp 98 7 °F (37 1 °C) (Tympanic)   Ht 5' 10\" (1 778 m)   Wt 82 1 kg (181 lb)   SpO2 99%   BMI 25 97 kg/m²          Physical Exam  Constitutional:       Appearance: Normal appearance  Cardiovascular:      Rate and Rhythm: Normal rate and regular rhythm  Pulmonary:      Effort: Pulmonary effort is normal       Breath sounds: Normal breath sounds  Abdominal:      General: Abdomen is flat  Tenderness: There is no abdominal tenderness  Musculoskeletal:         General: Normal range of motion  Neurological:      General: No focal deficit present  Mental Status: He is alert and oriented to person, place, and time  Cranial Nerves: No cranial nerve deficit  Motor: No weakness        Gait: Gait normal            Bhakti Schwartz MD  "

## 2023-05-03 PROBLEM — F41.9 ANXIETY: Status: ACTIVE | Noted: 2023-05-03

## 2023-05-03 NOTE — ASSESSMENT & PLAN NOTE
Consider treatment if no other cause is found for symptoms, continue work up of numbness with PTH, calcium and will consult neurology

## 2023-05-17 ENCOUNTER — TELEPHONE (OUTPATIENT)
Dept: OTHER | Facility: OTHER | Age: 33
End: 2023-05-17

## 2023-06-29 ENCOUNTER — TELEPHONE (OUTPATIENT)
Dept: NEUROLOGY | Facility: CLINIC | Age: 33
End: 2023-06-29

## 2023-06-29 NOTE — TELEPHONE ENCOUNTER
Spoke with patient and confirmed upcoming appointment with Dr Zack Archuleta at  [facility] location

## 2023-07-06 ENCOUNTER — CONSULT (OUTPATIENT)
Dept: NEUROLOGY | Facility: CLINIC | Age: 33
End: 2023-07-06
Payer: COMMERCIAL

## 2023-07-06 VITALS
WEIGHT: 189 LBS | HEIGHT: 70 IN | DIASTOLIC BLOOD PRESSURE: 80 MMHG | HEART RATE: 78 BPM | SYSTOLIC BLOOD PRESSURE: 120 MMHG | BODY MASS INDEX: 27.06 KG/M2

## 2023-07-06 DIAGNOSIS — R20.0 NUMBNESS AND TINGLING: ICD-10-CM

## 2023-07-06 DIAGNOSIS — R20.2 NUMBNESS AND TINGLING: ICD-10-CM

## 2023-07-06 PROCEDURE — 99244 OFF/OP CNSLTJ NEW/EST MOD 40: CPT | Performed by: PSYCHIATRY & NEUROLOGY

## 2023-07-06 NOTE — PATIENT INSTRUCTIONS
- Ordered MRI brain. If negative, may consider EMG/NCS but uncertain if insurance will cover. - May need to discuss with primary care provider (PCP) about considering rheumatology consult. - Follow up with blood work ordered by PCP: calcium and parathyroid hormone. - Concern for stress/anxiety etiology.  Please consider further management of anxiety, work with PCP.  - Follow up in 3 months
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
## 2023-07-06 NOTE — ASSESSMENT & PLAN NOTE
Impression: Strong suspicion for psychogenic etiology, likely related to anxiety/stress. Will order MRI brain to rule out neurological etiology, such as MS/stroke, but very low suspicion for this. Plan:  - Ordered MRI brain w/wo contrast. If negative, may consider EMG/NCS but uncertain if insurance will cover. - May need to discuss with primary care provider (PCP) about considering rheumatology consult. - Follow up with blood work ordered by PCP: calcium and PTH.  - Concern for stress/anxiety etiology.  Please consider further management of anxiety, work with PCP.  - Follow up in 3 months

## 2023-07-10 ENCOUNTER — APPOINTMENT (OUTPATIENT)
Dept: LAB | Facility: MEDICAL CENTER | Age: 33
End: 2023-07-10
Payer: COMMERCIAL

## 2023-07-10 DIAGNOSIS — E83.52 HYPERCALCEMIA: ICD-10-CM

## 2023-07-10 LAB
CALCIUM SERPL-MCNC: 9.7 MG/DL (ref 8.3–10.1)
PTH-INTACT SERPL-MCNC: 31.9 PG/ML (ref 12–88)

## 2023-07-10 PROCEDURE — 82310 ASSAY OF CALCIUM: CPT

## 2023-07-10 PROCEDURE — 83970 ASSAY OF PARATHORMONE: CPT

## 2023-07-10 PROCEDURE — 36415 COLL VENOUS BLD VENIPUNCTURE: CPT

## 2023-07-11 ENCOUNTER — OFFICE VISIT (OUTPATIENT)
Dept: FAMILY MEDICINE CLINIC | Facility: CLINIC | Age: 33
End: 2023-07-11
Payer: COMMERCIAL

## 2023-07-11 VITALS
WEIGHT: 185 LBS | DIASTOLIC BLOOD PRESSURE: 78 MMHG | SYSTOLIC BLOOD PRESSURE: 122 MMHG | HEART RATE: 86 BPM | HEIGHT: 72 IN | BODY MASS INDEX: 25.06 KG/M2 | OXYGEN SATURATION: 95 %

## 2023-07-11 DIAGNOSIS — E78.5 HYPERLIPIDEMIA, UNSPECIFIED HYPERLIPIDEMIA TYPE: ICD-10-CM

## 2023-07-11 DIAGNOSIS — L23.89 ALLERGIC CONTACT DERMATITIS DUE TO OTHER AGENTS: ICD-10-CM

## 2023-07-11 DIAGNOSIS — R20.2 NUMBNESS AND TINGLING: ICD-10-CM

## 2023-07-11 DIAGNOSIS — Z00.00 ANNUAL PHYSICAL EXAM: Primary | ICD-10-CM

## 2023-07-11 DIAGNOSIS — R20.0 NUMBNESS AND TINGLING: ICD-10-CM

## 2023-07-11 DIAGNOSIS — Z23 ENCOUNTER FOR IMMUNIZATION: ICD-10-CM

## 2023-07-11 PROCEDURE — 99395 PREV VISIT EST AGE 18-39: CPT | Performed by: FAMILY MEDICINE

## 2023-07-11 RX ORDER — TRIAMCINOLONE ACETONIDE 1 MG/G
CREAM TOPICAL 2 TIMES DAILY
Qty: 30 G | Refills: 0 | Status: SHIPPED | OUTPATIENT
Start: 2023-07-11

## 2023-07-11 NOTE — PATIENT INSTRUCTIONS
1. Hyperlipidemia, unspecified hyperlipidemia type  -     Lipid panel; Future; Expected date: 10/11/2023    2. Annual physical exam    3. Need for hepatitis C screening test    4. Screening for HIV (human immunodeficiency virus)    5. Encounter for immunization    6. Numbness and tingling    7.  Allergic contact dermatitis due to other agents  -     triamcinolone (KENALOG) 0.1 % cream; Apply topically 2 (two) times a day

## 2023-07-11 NOTE — PROGRESS NOTES
[facility]    NAME: Juan C Beasley  AGE: 28 y.o. SEX: male  : 1990     DATE: 2023     Assessment and Plan:     Problem List Items Addressed This Visit        Musculoskeletal and Integument    Allergic contact dermatitis due to other agents     Consistent with contact dermatitis possible poison ivy as starting to improve will use triamcinolone as needed we will consider high-dose topical steroids if not improving         Relevant Medications    triamcinolone (KENALOG) 0.1 % cream       Other    Numbness and tingling     Improving with counseling, has pending MRI per neurology         Hyperlipidemia     Recheck prior to next visit continue Crestor at this time         Relevant Orders    Lipid panel   Other Visit Diagnoses     Annual physical exam    -  Primary    Encounter for immunization              Immunizations and preventive care screenings were discussed with patient today. Appropriate education was printed on patient's after visit summary. [facility] at [facility].    Counseling:  · Exercise: the importance of regular exercise/physical activity was discussed. Recommend exercise 3-5 times per week for at least 30 minutes. Having a baby soon. Return in about 6 months (around 2024). Chief Complaint:     Chief Complaint   Patient presents with   • Physical Exam      History of Present Illness:     Adult Annual Physical   Patient here for a comprehensive physical exam. The patient reports problems - tingling anxiety, rash. Diet and Physical Activity  · Diet/Nutrition: well balanced diet. · Exercise: moderate cardiovascular exercise. Depression Screening  PHQ-2/9 Depression Screening         General Health  · Sleep: sleeps well. · Hearing: normal - bilateral.  · Vision: no vision problems. · Dental: regular dental visits.         Health  · History of STDs?: no.     Review of Systems: Review of Systems   Constitutional: Negative for activity change and appetite change. Respiratory: Negative for apnea and chest tightness. Cardiovascular: Negative for chest pain and palpitations. Gastrointestinal: Negative for abdominal distention and abdominal pain. Musculoskeletal: Negative for arthralgias and back pain. Past Medical History:     Past Medical History:   Diagnosis Date   • Anxiety    • Rhabdomyolysis       Past Surgical History:     History reviewed. No pertinent surgical history. Social History:     Social History     Socioeconomic History   • Marital status: /Civil Union     Spouse name: None   • Number of children: None   • Years of education: None   • Highest education level: None   Occupational History   • None   Tobacco Use   • Smoking status: Never   • Smokeless tobacco: Never   Substance and Sexual Activity   • Alcohol use:  Yes   • Drug use: Never   • Sexual activity: None   Other Topics Concern   • None   Social History Narrative   • None     Social Determinants of Health     Financial Resource Strain: Not on file   Food Insecurity: Not on file   Transportation Needs: Not on file   Physical Activity: Not on file   Stress: Not on file   Social Connections: Not on file   Intimate Partner Violence: Not on file   Housing Stability: Not on file      Family History:     Family History   Problem Relation Age of Onset   • Crohn's disease Brother    • Hyperlipidemia Maternal Aunt    • Hyperlipidemia Maternal Uncle       Current Medications:     Current Outpatient Medications   Medication Sig Dispense Refill   • albuterol (PROVENTIL HFA,VENTOLIN HFA) 90 mcg/act inhaler Inhale 2 puffs every 6 (six) hours as needed     • hydrOXYzine HCL (ATARAX) 25 mg tablet Take 1 tablet (25 mg total) by mouth every 6 (six) hours as needed for itching or anxiety 30 tablet 1   • loratadine (CLARITIN) 10 mg tablet Take 10 mg by mouth daily     • rosuvastatin (CRESTOR) 5 mg tablet Take 1 tablet (5 mg total) by mouth daily 90 tablet 1   • triamcinolone (KENALOG) 0.1 % cream Apply topically 2 (two) times a day 30 g 0     No current facility-administered medications for this visit. Allergies: Allergies   Allergen Reactions   • Bee Venom      Hives, resp distress      Physical Exam:     /78 (BP Location: Left arm, Patient Position: Sitting, Cuff Size: Standard)   Pulse 86   Ht 6' (1.829 m)   Wt 83.9 kg (185 lb)   SpO2 95%   BMI 25.09 kg/m²     Physical Exam  Constitutional:       Appearance: Normal appearance. Cardiovascular:      Rate and Rhythm: Normal rate and regular rhythm. Pulses: Normal pulses. Heart sounds: Normal heart sounds. Pulmonary:      Effort: Pulmonary effort is normal.      Breath sounds: Normal breath sounds. Abdominal:      General: Abdomen is flat. Tenderness: There is no abdominal tenderness. Musculoskeletal:         General: Normal range of motion. Skin:     Comments: erythemetous rash on chest, rash with calamine lotion on forearms   Neurological:      Mental Status: He is alert.           Joseilne Madrigal MD   [address] [place] [address]

## 2023-07-11 NOTE — ASSESSMENT & PLAN NOTE
Consistent with contact dermatitis possible poison ivy as starting to improve will use triamcinolone as needed we will consider high-dose topical steroids if not improving

## 2023-07-19 DIAGNOSIS — F41.8 ANXIETY WITH DEPRESSION: ICD-10-CM

## 2023-07-19 RX ORDER — HYDROXYZINE HYDROCHLORIDE 25 MG/1
TABLET, FILM COATED ORAL
Qty: 30 TABLET | Refills: 1 | Status: SHIPPED | OUTPATIENT
Start: 2023-07-19

## 2023-08-08 ENCOUNTER — HOSPITAL ENCOUNTER (OUTPATIENT)
Facility: MEDICAL CENTER | Age: 33
Discharge: HOME | End: 2023-08-08
Payer: COMMERCIAL

## 2023-08-08 DIAGNOSIS — R20.0 NUMBNESS AND TINGLING: ICD-10-CM

## 2023-08-08 DIAGNOSIS — R20.2 NUMBNESS AND TINGLING: ICD-10-CM

## 2023-08-08 PROCEDURE — 70553 MRI BRAIN STEM W/O & W/DYE: CPT

## 2023-08-08 PROCEDURE — A9585 GADOBUTROL INJECTION: HCPCS

## 2023-08-08 PROCEDURE — G1004 CDSM NDSC: HCPCS

## 2023-08-08 RX ORDER — GADOBUTROL 604.72 MG/ML
8 INJECTION INTRAVENOUS
Status: COMPLETED | OUTPATIENT
Start: 2023-08-08 | End: 2023-08-08

## 2023-08-08 RX ADMIN — GADOBUTROL 8 ML: 604.72 INJECTION INTRAVENOUS at 17:31

## 2023-09-27 ENCOUNTER — OFFICE VISIT (OUTPATIENT)
Dept: URGENT CARE | Facility: MEDICAL CENTER | Age: 33
End: 2023-09-27
Payer: COMMERCIAL

## 2023-09-27 ENCOUNTER — APPOINTMENT (OUTPATIENT)
Dept: RADIOLOGY | Facility: MEDICAL CENTER | Age: 33
End: 2023-09-27
Payer: COMMERCIAL

## 2023-09-27 VITALS
TEMPERATURE: 99.3 F | HEART RATE: 79 BPM | DIASTOLIC BLOOD PRESSURE: 74 MMHG | OXYGEN SATURATION: 97 % | RESPIRATION RATE: 18 BRPM | SYSTOLIC BLOOD PRESSURE: 129 MMHG

## 2023-09-27 DIAGNOSIS — U07.1 COVID-19: Primary | ICD-10-CM

## 2023-09-27 DIAGNOSIS — Z86.59 H/O ANXIETY DISORDER: ICD-10-CM

## 2023-09-27 DIAGNOSIS — R06.02 SOB (SHORTNESS OF BREATH): ICD-10-CM

## 2023-09-27 LAB
ATRIAL RATE: 65 BPM
P AXIS: 23 DEGREES
PR INTERVAL: 168 MS
QRS AXIS: 31 DEGREES
QRSD INTERVAL: 84 MS
QT INTERVAL: 370 MS
QTC INTERVAL: 384 MS
T WAVE AXIS: 34 DEGREES
VENTRICULAR RATE: 65 BPM

## 2023-09-27 PROCEDURE — 71046 X-RAY EXAM CHEST 2 VIEWS: CPT

## 2023-09-27 PROCEDURE — 99213 OFFICE O/P EST LOW 20 MIN: CPT

## 2023-09-27 PROCEDURE — 93005 ELECTROCARDIOGRAM TRACING: CPT

## 2023-09-27 PROCEDURE — 93010 ELECTROCARDIOGRAM REPORT: CPT | Performed by: STUDENT IN AN ORGANIZED HEALTH CARE EDUCATION/TRAINING PROGRAM

## 2023-09-27 NOTE — PATIENT INSTRUCTIONS
Vitamin D3 2000 IU daily  Vitamin C 1000mg twice per day  Multivitamin daily  Fluids and rest  Over the counter cold medication as needed (EX: Mucinex, tylenol/motrin)  Follow up with PCP in 3-5 days. Proceed to ER if symptoms worsen.

## 2023-09-27 NOTE — PROGRESS NOTES
[facility]        NAME: Saloni Son is a 28 y.o. male  : 1990    MRN: [id number]  DATE: 2023  TIME: 7:52 PM    Assessment and Plan   COVID-19 [U07.1]  1. COVID-19        2. SOB (shortness of breath)  XR chest pa & lateral      3. H/O anxiety disorder          CXR reviewed: No acute abnormalities noted. Pending radiology final read. EKG: NSR. No acute abnormalities noted. Discussed with patient at length risks vs. benefits of transferring to ER for further evaluation and treatment. Patient verbalized understanding. Patient refused self-transfer and ambulance transfer to ER. Discussed with patient at length to seek further evaluation and treatment in the ER if symptoms worsen, including but not limited to, increased chest pain, worsening SOB, increased abdominal pain, n/v/d, urinary sx, etc. Patient verbalized understanding. Patient Instructions     Vitamin D3 2000 IU daily  Vitamin C 1000mg twice per day  Multivitamin daily  Fluids and rest  Over the counter cold medication as needed (EX: Mucinex, tylenol/motrin)  Follow up with PCP in 3-5 days. Proceed to ER if symptoms worsen. Chief Complaint     Chief Complaint   Patient presents with   • Shortness of Breath     Has had chest tightness, back pain and pressure in the chest left side. Did test positive for Covid on Monday. Has a hx of asthma. Feet and hands are constantly cold. Toenails were blue. History of Present Illness       Patient is a 29 yo male with significant PMH of asthma and anxiety presenting in the clinic today for cold sx x 4 days. Patient notes taking an at home covid test 2 days ago which was positive. Admits squeezing pain located in chest which radiates to the back and across b/l chest wall. Patient notes this "squeezing pain" started 4 days ago but has  been gradually worsening. He also notes "tightness" of the left arm and left leg which also began 4 days ago. Admits SOB, fever, chills, body aches, fatigue, intermittent dizziness, headache, epigastric pain, n/v/d. Denies sore throat and ear pain. Admits the use of albuterol inhaler, DayQuil, and NyQuil for sx management. Patient notes this is his first case of having COVID. Admits h/o pneumonia. Patient notes h/o chronic numbness and tingling. Patient notes his tingling in his left arm and left leg are "different from his normal". Patient notes h/o anxiety. He states he has not taken his anxiety medications in over one week. He notes recent stressors in his life as "my wife is pregnant and now I have covid". Review of Systems   Review of Systems   Constitutional: Positive for chills, fatigue and fever. HENT: Negative for congestion, ear pain, postnasal drip, rhinorrhea, sinus pressure, sinus pain and sore throat. Respiratory: Positive for chest tightness and shortness of breath. Negative for cough. Cardiovascular: Positive for chest pain. Gastrointestinal: Positive for abdominal pain, diarrhea, nausea and vomiting. Musculoskeletal: Positive for myalgias. Skin: Negative for rash. Neurological: Positive for dizziness and headaches.          Current Medications       Current Outpatient Medications:   •  albuterol (PROVENTIL HFA,VENTOLIN HFA) 90 mcg/act inhaler, Inhale 2 puffs every 6 (six) hours as needed, Disp: , Rfl:   •  rosuvastatin (CRESTOR) 5 mg tablet, Take 1 tablet (5 mg total) by mouth daily, Disp: 90 tablet, Rfl: 1  •  hydrOXYzine HCL (ATARAX) 25 mg tablet, TAKE 1 TABLET (25 MG TOTAL) BY MOUTH EVERY 6 HOURS AS NEEDED FOR ITCHING OR ANXIETY (Patient not taking: Reported on 9/27/2023), Disp: 30 tablet, Rfl: 1  •  loratadine (CLARITIN) 10 mg tablet, Take 10 mg by mouth daily (Patient not taking: Reported on 9/27/2023), Disp: , Rfl:   •  triamcinolone (KENALOG) 0.1 % cream, Apply topically 2 (two) times a day (Patient not taking: Reported on 9/27/2023), Disp: 30 g, Rfl: 0    Current Allergies     Allergies as of 09/27/2023 - Reviewed 09/27/2023   Allergen Reaction Noted   • Bee venom  12/03/2020            The following portions of the patient's history were reviewed and updated as appropriate: allergies, current medications, past family history, past medical history, past social history, past surgical history and problem list.     Past Medical History:   Diagnosis Date   • Anxiety    • Rhabdomyolysis        No past surgical history on file. Family History   Problem Relation Age of Onset   • Crohn's disease Brother    • Hyperlipidemia Maternal Aunt    • Hyperlipidemia Maternal Uncle          Medications have been verified. Objective   /74 (BP Location: Left arm, Patient Position: Sitting)   Pulse 79   Temp 99.3 °F (37.4 °C)   Resp 18   SpO2 97%        Physical Exam     Physical Exam  Vitals reviewed. Constitutional:       General: He is not in acute distress. Appearance: Normal appearance. He is well-developed and normal weight. He is not ill-appearing. HENT:      Head: Normocephalic. Right Ear: Hearing, tympanic membrane, ear canal and external ear normal. No middle ear effusion. There is no impacted cerumen. Tympanic membrane is not erythematous or bulging. Left Ear: Hearing, tympanic membrane, ear canal and external ear normal.  No middle ear effusion. There is no impacted cerumen. Tympanic membrane is not erythematous or bulging. Nose: Nose normal. No congestion or rhinorrhea. Right Sinus: No maxillary sinus tenderness or frontal sinus tenderness. Left Sinus: No maxillary sinus tenderness or frontal sinus tenderness. Mouth/Throat:      Lips: Pink. Mouth: Mucous membranes are moist.      Pharynx: Oropharynx is clear. Uvula midline. Posterior oropharyngeal erythema present. No pharyngeal swelling, oropharyngeal exudate or uvula swelling. Tonsils: No tonsillar exudate or tonsillar abscesses. 1+ on the right. 1+ on the left.       Comments: B/L tonsilliths noted.  Eyes:      General:         Right eye: No discharge. Left eye: No discharge. Extraocular Movements: Extraocular movements intact. Right eye: No nystagmus. Left eye: No nystagmus. Conjunctiva/sclera: Conjunctivae normal.      Right eye: Right conjunctiva is not injected. No chemosis. Left eye: Left conjunctiva is not injected. No chemosis. Pupils: Pupils are equal, round, and reactive to light. Cardiovascular:      Rate and Rhythm: Normal rate and regular rhythm. Pulses: Normal pulses. Heart sounds: Normal heart sounds. No murmur heard. No friction rub. No gallop. Pulmonary:      Effort: Pulmonary effort is normal. No tachypnea, bradypnea or respiratory distress. Breath sounds: Normal breath sounds. No stridor. No decreased breath sounds, wheezing, rhonchi or rales. Abdominal:      General: Abdomen is flat. Bowel sounds are normal. There is no distension. Palpations: Abdomen is soft. Tenderness: There is generalized abdominal tenderness. There is no guarding. Musculoskeletal:      Cervical back: Normal range of motion and neck supple. No tenderness. Lymphadenopathy:      Cervical: No cervical adenopathy. Skin:     General: Skin is warm. Findings: No rash. Neurological:      Mental Status: He is alert. Sensory: Sensory deficit (Decreased light touch sensation throughout left arm and left leg which patient notes is chronic) present. Gait: Gait normal.   Psychiatric:         Mood and Affect: Mood is anxious.

## 2023-10-16 DIAGNOSIS — F41.8 ANXIETY WITH DEPRESSION: ICD-10-CM

## 2023-10-16 RX ORDER — HYDROXYZINE HYDROCHLORIDE 25 MG/1
TABLET, FILM COATED ORAL
Qty: 30 TABLET | Refills: 1 | Status: SHIPPED | OUTPATIENT
Start: 2023-10-16

## 2023-10-25 ENCOUNTER — HOSPITAL ENCOUNTER (EMERGENCY)
Facility: HOSPITAL | Age: 33
Discharge: HOME | End: 2023-10-25
Attending: EMERGENCY MEDICINE
Payer: COMMERCIAL

## 2023-10-25 ENCOUNTER — APPOINTMENT (EMERGENCY)
Dept: CT IMAGING | Facility: HOSPITAL | Age: 33
End: 2023-10-25
Payer: COMMERCIAL

## 2023-10-25 ENCOUNTER — APPOINTMENT (EMERGENCY)
Dept: RADIOLOGY | Facility: HOSPITAL | Age: 33
End: 2023-10-25
Payer: COMMERCIAL

## 2023-10-25 VITALS
TEMPERATURE: 98.4 F | DIASTOLIC BLOOD PRESSURE: 81 MMHG | SYSTOLIC BLOOD PRESSURE: 139 MMHG | OXYGEN SATURATION: 98 % | WEIGHT: 176.37 LBS | BODY MASS INDEX: 23.92 KG/M2 | RESPIRATION RATE: 16 BRPM | HEART RATE: 80 BPM

## 2023-10-25 DIAGNOSIS — R20.0 NUMBNESS: ICD-10-CM

## 2023-10-25 DIAGNOSIS — F41.9 ANXIETY: ICD-10-CM

## 2023-10-25 DIAGNOSIS — R07.9 CHEST PAIN, UNSPECIFIED: Primary | ICD-10-CM

## 2023-10-25 LAB
ALBUMIN SERPL BCP-MCNC: 5.4 G/DL (ref 3.5–5)
ALP SERPL-CCNC: 55 U/L (ref 34–104)
ALT SERPL W P-5'-P-CCNC: 25 U/L (ref 7–52)
ANION GAP SERPL CALCULATED.3IONS-SCNC: 8 MMOL/L
AST SERPL W P-5'-P-CCNC: 20 U/L (ref 13–39)
BASOPHILS # BLD AUTO: 0.06 THOUSANDS/ÂΜL (ref 0–0.1)
BASOPHILS NFR BLD AUTO: 1 % (ref 0–1)
BILIRUB SERPL-MCNC: 0.53 MG/DL (ref 0.2–1)
BUN SERPL-MCNC: 8 MG/DL (ref 5–25)
CALCIUM SERPL-MCNC: 10 MG/DL (ref 8.4–10.2)
CARDIAC TROPONIN I PNL SERPL HS: <2 NG/L
CHLORIDE SERPL-SCNC: 101 MMOL/L (ref 96–108)
CO2 SERPL-SCNC: 30 MMOL/L (ref 21–32)
CREAT SERPL-MCNC: 0.89 MG/DL (ref 0.6–1.3)
D DIMER PPP FEU-MCNC: 0.38 UG/ML FEU
EOSINOPHIL # BLD AUTO: 0.16 THOUSAND/ÂΜL (ref 0–0.61)
EOSINOPHIL NFR BLD AUTO: 2 % (ref 0–6)
ERYTHROCYTE [DISTWIDTH] IN BLOOD BY AUTOMATED COUNT: 12.7 % (ref 11.6–15.1)
GFR SERPL CREATININE-BSD FRML MDRD: 111 ML/MIN/1.73SQ M
GLUCOSE SERPL-MCNC: 114 MG/DL (ref 65–140)
HCT VFR BLD AUTO: 50.6 % (ref 36.5–49.3)
HGB BLD-MCNC: 16.2 G/DL (ref 12–17)
IMM GRANULOCYTES # BLD AUTO: 0.02 THOUSAND/UL (ref 0–0.2)
IMM GRANULOCYTES NFR BLD AUTO: 0 % (ref 0–2)
LYMPHOCYTES # BLD AUTO: 0.88 THOUSANDS/ÂΜL (ref 0.6–4.47)
LYMPHOCYTES NFR BLD AUTO: 12 % (ref 14–44)
MCH RBC QN AUTO: 29.5 PG (ref 26.8–34.3)
MCHC RBC AUTO-ENTMCNC: 32 G/DL (ref 31.4–37.4)
MCV RBC AUTO: 92 FL (ref 82–98)
MONOCYTES # BLD AUTO: 0.42 THOUSAND/ÂΜL (ref 0.17–1.22)
MONOCYTES NFR BLD AUTO: 6 % (ref 4–12)
NEUTROPHILS # BLD AUTO: 5.65 THOUSANDS/ÂΜL (ref 1.85–7.62)
NEUTS SEG NFR BLD AUTO: 79 % (ref 43–75)
NRBC BLD AUTO-RTO: 0 /100 WBCS
PLATELET # BLD AUTO: 202 THOUSANDS/UL (ref 149–390)
PMV BLD AUTO: 10.9 FL (ref 8.9–12.7)
POTASSIUM SERPL-SCNC: 4 MMOL/L (ref 3.5–5.3)
PROT SERPL-MCNC: 8.1 G/DL (ref 6.4–8.4)
RBC # BLD AUTO: 5.49 MILLION/UL (ref 3.88–5.62)
SODIUM SERPL-SCNC: 139 MMOL/L (ref 135–147)
WBC # BLD AUTO: 7.19 THOUSAND/UL (ref 4.31–10.16)

## 2023-10-25 PROCEDURE — 36415 COLL VENOUS BLD VENIPUNCTURE: CPT | Performed by: EMERGENCY MEDICINE

## 2023-10-25 PROCEDURE — 85025 COMPLETE CBC W/AUTO DIFF WBC: CPT | Performed by: EMERGENCY MEDICINE

## 2023-10-25 PROCEDURE — 84484 ASSAY OF TROPONIN QUANT: CPT | Performed by: EMERGENCY MEDICINE

## 2023-10-25 PROCEDURE — 93005 ELECTROCARDIOGRAM TRACING: CPT

## 2023-10-25 PROCEDURE — 85379 FIBRIN DEGRADATION QUANT: CPT | Performed by: EMERGENCY MEDICINE

## 2023-10-25 PROCEDURE — 71045 X-RAY EXAM CHEST 1 VIEW: CPT

## 2023-10-25 PROCEDURE — 99285 EMERGENCY DEPT VISIT HI MDM: CPT | Performed by: EMERGENCY MEDICINE

## 2023-10-25 PROCEDURE — 99285 EMERGENCY DEPT VISIT HI MDM: CPT

## 2023-10-25 PROCEDURE — 70450 CT HEAD/BRAIN W/O DYE: CPT

## 2023-10-25 PROCEDURE — 80053 COMPREHEN METABOLIC PANEL: CPT | Performed by: EMERGENCY MEDICINE

## 2023-10-25 RX ORDER — LORAZEPAM 1 MG/1
1 TABLET ORAL ONCE
Status: COMPLETED | OUTPATIENT
Start: 2023-10-25 | End: 2023-10-25

## 2023-10-25 RX ADMIN — LORAZEPAM 1 MG: 1 TABLET ORAL at 16:45

## 2023-10-25 NOTE — ED PROVIDER NOTES
History  Chief Complaint   Patient presents with    Chest Pain     Pt reports he has had L chest numbness for awhile now, but is now radiating to L shoulder/arm. Describes pain as tightness/numbness. SOB on exertion and leg leg swelling. Pain on both sides of abdomen that pt states is from coughing. Hx anxiety. Patient is a 76-year-old male. He does have a history of anxiety. Cardiac risk factors include hyperlipidemia. No history of diabetes or hypertension. Patient is a non-smoker. No family history of early coronary artery disease. Patient has no personal history of thromboembolic disease or coronary artery disease. He presents to the emergency room for evaluation of a chest discomfort. It is more of a numbness in the left side of his chest and also up in his left shoulder and left back. Does seem to radiate a little bit to the right side. He has also been experiencing numbness to all his extremities for several months. Worse on the left than the right. There is no shortness of breath, cough, hemoptysis, calf pain, nausea, or diaphoresis. No dyspnea on exertion. No thoracic back pain. No migration. She does report some swelling to his left foot. Prior to Admission Medications   Prescriptions Last Dose Informant Patient Reported? Taking?    albuterol (PROVENTIL HFA,VENTOLIN HFA) 90 mcg/act inhaler   Yes No   Sig: Inhale 2 puffs every 6 (six) hours as needed   hydrOXYzine HCL (ATARAX) 25 mg tablet   No No   Sig: TAKE 1 TABLET (25 MG TOTAL) BY MOUTH EVERY 6 HOURS AS NEEDED FOR ITCHING OR ANXIETY   loratadine (CLARITIN) 10 mg tablet   Yes No   Sig: Take 10 mg by mouth daily   Patient not taking: Reported on 9/27/2023   rosuvastatin (CRESTOR) 5 mg tablet   No No   Sig: Take 1 tablet (5 mg total) by mouth daily   triamcinolone (KENALOG) 0.1 % cream   No No   Sig: Apply topically 2 (two) times a day   Patient not taking: Reported on 9/27/2023      Facility-Administered Medications: None Past Medical History:   Diagnosis Date    Anxiety     Rhabdomyolysis        History reviewed. No pertinent surgical history. Family History   Problem Relation Age of Onset    Crohn's disease Brother     Hyperlipidemia Maternal Aunt     Hyperlipidemia Maternal Uncle      I have reviewed and agree with the history as documented. E-Cigarette/Vaping     E-Cigarette/Vaping Substances     Social History     Tobacco Use    Smoking status: Never    Smokeless tobacco: Never   Substance Use Topics    Alcohol use: Yes    Drug use: Never       Review of Systems   Constitutional:  Negative for chills and fever. HENT:  Negative for rhinorrhea and sore throat. Eyes:  Negative for pain, redness and visual disturbance. Respiratory:  Negative for cough and shortness of breath. Cardiovascular:  Positive for chest pain. Negative for leg swelling. Gastrointestinal:  Negative for abdominal pain, diarrhea and vomiting. Endocrine: Negative for polydipsia and polyuria. Genitourinary:  Negative for dysuria, frequency and hematuria. Musculoskeletal:  Negative for back pain and neck pain. Skin:  Negative for rash and wound. Allergic/Immunologic: Negative for immunocompromised state. Neurological:  Positive for numbness. Negative for weakness and headaches. Psychiatric/Behavioral:  Negative for hallucinations and suicidal ideas. The patient is nervous/anxious. All other systems reviewed and are negative. Physical Exam  Physical Exam  Vitals reviewed. Constitutional:       General: He is not in acute distress. Appearance: He is not toxic-appearing. HENT:      Head: Normocephalic and atraumatic. Nose: Nose normal.      Mouth/Throat:      Mouth: Mucous membranes are moist.   Eyes:      General:         Right eye: No discharge. Left eye: No discharge. Extraocular Movements: Extraocular movements intact.       Conjunctiva/sclera: Conjunctivae normal.      Pupils: Pupils are equal, round, and reactive to light. Cardiovascular:      Rate and Rhythm: Normal rate and regular rhythm. Pulses: Normal pulses. Heart sounds: Normal heart sounds. No murmur heard. No friction rub. No gallop. Pulmonary:      Effort: Pulmonary effort is normal. No respiratory distress. Breath sounds: Normal breath sounds. No stridor. No wheezing, rhonchi or rales. Abdominal:      General: Bowel sounds are normal. There is no distension. Palpations: Abdomen is soft. Tenderness: There is no abdominal tenderness. There is no right CVA tenderness, left CVA tenderness, guarding or rebound. Musculoskeletal:         General: No swelling, tenderness, deformity or signs of injury. Normal range of motion. Cervical back: Normal range of motion and neck supple. No rigidity. Right lower leg: No edema. Left lower leg: No edema. Comments: No calf pain or unilateral leg swelling   Skin:     General: Skin is warm and dry. Coloration: Skin is not jaundiced or pale. Findings: No bruising, erythema or rash. Neurological:      General: No focal deficit present. Mental Status: He is alert and oriented to person, place, and time. GCS: GCS eye subscore is 4. GCS verbal subscore is 5. GCS motor subscore is 6. Cranial Nerves: No facial asymmetry. Sensory: No sensory deficit. Motor: Motor function is intact. No weakness. Coordination: Coordination is intact. Comments: There is numbness to touch in the left arm and left leg compared to the right.    Psychiatric:         Mood and Affect: Mood normal.         Behavior: Behavior normal.         Vital Signs  ED Triage Vitals [10/25/23 1459]   Temperature Pulse Respirations Blood Pressure SpO2   98.4 °F (36.9 °C) 80 16 139/81 98 %      Temp Source Heart Rate Source Patient Position - Orthostatic VS BP Location FiO2 (%)   Oral Monitor Sitting Right arm --      Pain Score       5           Vitals: 10/25/23 1459   BP: 139/81   Pulse: 80   Patient Position - Orthostatic VS: Sitting         Visual Acuity      ED Medications  Medications   LORazepam (ATIVAN) tablet 1 mg (1 mg Oral Given 10/25/23 1645)       Diagnostic Studies  Results Reviewed       Procedure Component Value Units Date/Time    D-Dimer [[id number]]  (Normal) Collected: 10/25/23 1534    Lab Status: Final result Specimen: Blood from Arm, Right Updated: 10/25/23 1621     D-Dimer, Quant 0.38 ug/ml FEU     HS Troponin 0hr (reflex protocol) [[id number]]  (Normal) Collected: 10/25/23 1534    Lab Status: Final result Specimen: Blood from Arm, Right Updated: 10/25/23 1607     hs TnI 0hr <2 ng/L     Comprehensive metabolic panel [[id number]]  (Abnormal) Collected: 10/25/23 1534    Lab Status: Final result Specimen: Blood from Arm, Right Updated: 10/25/23 1601     Sodium 139 mmol/L      Potassium 4.0 mmol/L      Chloride 101 mmol/L      CO2 30 mmol/L      ANION GAP 8 mmol/L      BUN 8 mg/dL      Creatinine 0.89 mg/dL      Glucose 114 mg/dL      Calcium 10.0 mg/dL      AST 20 U/L      ALT 25 U/L      Alkaline Phosphatase 55 U/L      Total Protein 8.1 g/dL      Albumin 5.4 g/dL      Total Bilirubin 0.53 mg/dL      eGFR 111 ml/min/1.73sq m     Narrative:      [facility] guidelines for Chronic Kidney Disease (CKD):     Stage 1 with normal or high GFR (GFR > 90 mL/min/1.73 square meters)    Stage 2 Mild CKD (GFR = 60-89 mL/min/1.73 square meters)    Stage 3A Moderate CKD (GFR = 45-59 mL/min/1.73 square meters)    Stage 3B Moderate CKD (GFR = 30-44 mL/min/1.73 square meters)    Stage 4 Severe CKD (GFR = 15-29 mL/min/1.73 square meters)    Stage 5 End Stage CKD (GFR <15 mL/min/1.73 square meters)  Note: GFR calculation is accurate only with a steady state creatinine    CBC and differential [[id number]]  (Abnormal) Collected: 10/25/23 1534    Lab Status: Final result Specimen: Blood from Arm, Right Updated: 10/25/23 1545     WBC 7.19 Thousand/uL      RBC 5.49 Million/uL      Hemoglobin 16.2 g/dL      Hematocrit 50.6 %      MCV 92 fL      MCH 29.5 pg      MCHC 32.0 g/dL      RDW 12.7 %      MPV 10.9 fL      Platelets 818 Thousands/uL      nRBC 0 /100 WBCs      Neutrophils Relative 79 %      Immat GRANS % 0 %      Lymphocytes Relative 12 %      Monocytes Relative 6 %      Eosinophils Relative 2 %      Basophils Relative 1 %      Neutrophils Absolute 5.65 Thousands/µL      Immature Grans Absolute 0.02 Thousand/uL      Lymphocytes Absolute 0.88 Thousands/µL      Monocytes Absolute 0.42 Thousand/µL      Eosinophils Absolute 0.16 Thousand/µL      Basophils Absolute 0.06 Thousands/µL                    CT head without contrast   Final Result by Abdi Mccoy MD (10/25 1640)      No acute intracranial abnormality. Workstation performed: [id number]         XR chest 1 view portable   ED Interpretation by Warren Diamond MD (10/25 0128)   No infiltrates. No CHF. No pneumothorax. No widened mediastinum. No acute disease. Procedures  ECG 12 Lead Documentation Only    Date/Time: 10/25/2023 4:02 PM    Performed by: Warren Diamond MD  Authorized by: Warren Diamond MD    ECG reviewed by me, the ED Provider: yes    Patient location:  ED  Interpretation:     Interpretation: normal    Rate:     ECG rate assessment: normal    Rhythm:     Rhythm: sinus rhythm    Ectopy:     Ectopy: none    QRS:     QRS axis:  Normal  Conduction:     Conduction: normal    ST segments:     ST segments:  Normal  T waves:     T waves: normal             ED Course                                             Medical Decision Making  EKG was normal.  Troponin was negative. Heart score is low. Doubt acute myocardial infarction. D-dimer was negative making DVT or pulmonary embolism unlikely. No back pain or migration to suggest dissection. Furthermore symptoms are chronic. This or make dissection unlikely.   No pneumothorax or pneumonia on chest x-ray. No EKG evidence of pericarditis. No troponin evidence of myocarditis. Patient did have an MRI recently did not show any acute stroke. CT today did not show any acute stroke. Suspect most of his symptoms are functional.  Recommended close follow-up with his primary MD for further evaluation and treatment. He probably should be started on an SSRI for anxiety. Did feel better after Ativan in the emergency room. He does have neurology follow-up also. Amount and/or Complexity of Data Reviewed  External Data Reviewed: radiology and notes. Labs: ordered. Decision-making details documented in ED Course. Radiology: ordered and independent interpretation performed. Decision-making details documented in ED Course. ECG/medicine tests: ordered and independent interpretation performed. Decision-making details documented in ED Course. Risk  Prescription drug management. Decision regarding hospitalization. Disposition  Final diagnoses:   Chest pain, unspecified   Numbness   Anxiety     Time reflects when diagnosis was documented in both MDM as applicable and the Disposition within this note       Time User Action Codes Description Comment    10/25/2023  6:03 PM Elverna Jenn Add [R07.9] Chest pain, unspecified     10/25/2023  6:03 PM Elverna Jenn Add [R20.0] Numbness     10/25/2023  6:04 PM Elverna Jenn Add [F41.9] Anxiety           ED Disposition       ED Disposition   Discharge    Condition   Stable    Date/Time   Wed Oct 25, 2023  6:03 PM    Comment   Janki Zaragoza Force discharge to home/self care.                    Follow-up Information       Follow up With Specialties Details Why Contact Info Additional Information    Dolores Malcolm MD Family Medicine In 1 week  [address] [address] [phone] [address] [facility] In 1 week  [address] [address] [place] [id number]  [facility], [address] [place] [address] [address], [place], [place]. [address], [phone]            Patient's Medications   Discharge Prescriptions    No medications on file           PDMP Review       None            ED Provider  Electronically Signed by             Warren Diamond MD  10/25/23 2809

## 2023-10-26 ENCOUNTER — OFFICE VISIT (OUTPATIENT)
Dept: NEUROLOGY | Facility: CLINIC | Age: 33
End: 2023-10-26
Payer: COMMERCIAL

## 2023-10-26 VITALS
TEMPERATURE: 97.6 F | SYSTOLIC BLOOD PRESSURE: 116 MMHG | HEIGHT: 72 IN | BODY MASS INDEX: 23.84 KG/M2 | DIASTOLIC BLOOD PRESSURE: 78 MMHG | WEIGHT: 176 LBS | HEART RATE: 69 BPM

## 2023-10-26 DIAGNOSIS — R20.2 NUMBNESS AND TINGLING: Primary | ICD-10-CM

## 2023-10-26 DIAGNOSIS — R20.0 NUMBNESS AND TINGLING: Primary | ICD-10-CM

## 2023-10-26 LAB
ATRIAL RATE: 76 BPM
P AXIS: 65 DEGREES
PR INTERVAL: 158 MS
QRS AXIS: 62 DEGREES
QRSD INTERVAL: 82 MS
QT INTERVAL: 364 MS
QTC INTERVAL: 409 MS
T WAVE AXIS: 62 DEGREES
VENTRICULAR RATE: 76 BPM

## 2023-10-26 PROCEDURE — 99214 OFFICE O/P EST MOD 30 MIN: CPT | Performed by: PSYCHIATRY & NEUROLOGY

## 2023-10-26 PROCEDURE — 93010 ELECTROCARDIOGRAM REPORT: CPT | Performed by: INTERNAL MEDICINE

## 2023-10-26 RX ORDER — ACETAMINOPHEN 500 MG
1000 TABLET ORAL AS NEEDED
COMMUNITY

## 2023-10-26 RX ORDER — IBUPROFEN 200 MG
600 TABLET ORAL EVERY 6 HOURS PRN
COMMUNITY

## 2023-10-26 NOTE — PATIENT INSTRUCTIONS
-Ordered MRI cervical spine with and without contrast  -Please obtain blood work for BUN and Creatinine 2 weeks prior to MRI  -Please EMG/NCS to check the function of muscles and nerves of your left arm and left leg  -Recommend over the counter natural herbal supplements for nerve health: 5-HTP 200mg daily and Rosy 750mg daily if you are interested  -Defer to primary care provider for evaluation of coldness of toes and evaluate arterial pulses and consider possible rheumatological etiology  -Recommend follow up with psychiatry for management of anxiety  -Follow up in 4 months after MRI and EMG is complete.
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
## 2023-10-27 NOTE — ASSESSMENT & PLAN NOTE
27YO M with history of anxiety and recent COVID infection who returns to [facility] for further evaluation and work-up for numbness/tingling that is primarily left-sided. MRI brain negative for structural pathology a/w generalized numbness (L worse than R). 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    Overall impression concerning for psychogenic etiology in the setting of severe anxiety, particularly illness anxiety. Noting worsening of numbness with increased life stressors. Plan to continue to monitor for spinal vs nerve pathologies with the following:  Ordered MRI cervical spine with and without contrast  Blood work for BUN and Creatinine 2 weeks prior to MRI  Ordered EMG/NCS of LUE and LLE to evaluate for nerve pathology  Recommended OTC natural herbal supplements for nerve health: 5-HTP 200mg daily and LAURA 750mg daily if interested. Patient would like to decline at this time.   Recommend PCP follow up for coldness of toes (possible arterial pathology vs rheumatological etiology?)  Recommend close follow up with psychiatry for anxiety management  Follow up in 4 months after MRI and EMG is complete

## 2023-11-01 ENCOUNTER — OFFICE VISIT (OUTPATIENT)
Dept: FAMILY MEDICINE CLINIC | Facility: CLINIC | Age: 33
End: 2023-11-01
Payer: COMMERCIAL

## 2023-11-01 VITALS
HEART RATE: 75 BPM | DIASTOLIC BLOOD PRESSURE: 72 MMHG | OXYGEN SATURATION: 96 % | WEIGHT: 176 LBS | BODY MASS INDEX: 23.87 KG/M2 | SYSTOLIC BLOOD PRESSURE: 126 MMHG

## 2023-11-01 DIAGNOSIS — F41.8 ANXIETY WITH DEPRESSION: ICD-10-CM

## 2023-11-01 DIAGNOSIS — E78.5 HYPERLIPIDEMIA, UNSPECIFIED HYPERLIPIDEMIA TYPE: Primary | ICD-10-CM

## 2023-11-01 DIAGNOSIS — M54.40 LOW BACK PAIN WITH SCIATICA, SCIATICA LATERALITY UNSPECIFIED, UNSPECIFIED BACK PAIN LATERALITY, UNSPECIFIED CHRONICITY: ICD-10-CM

## 2023-11-01 DIAGNOSIS — R20.2 NUMBNESS AND TINGLING: ICD-10-CM

## 2023-11-01 DIAGNOSIS — Z23 ENCOUNTER FOR IMMUNIZATION: ICD-10-CM

## 2023-11-01 DIAGNOSIS — R20.0 NUMBNESS AND TINGLING: ICD-10-CM

## 2023-11-01 PROCEDURE — 90471 IMMUNIZATION ADMIN: CPT

## 2023-11-01 PROCEDURE — 90686 IIV4 VACC NO PRSV 0.5 ML IM: CPT

## 2023-11-01 PROCEDURE — 99214 OFFICE O/P EST MOD 30 MIN: CPT | Performed by: INTERNAL MEDICINE

## 2023-11-01 RX ORDER — HYDROXYZINE HYDROCHLORIDE 25 MG/1
TABLET, FILM COATED ORAL
Qty: 30 TABLET | Refills: 1 | Status: CANCELLED | OUTPATIENT
Start: 2023-11-01

## 2023-11-01 RX ORDER — ESCITALOPRAM OXALATE 10 MG/1
10 TABLET ORAL DAILY
Qty: 90 TABLET | Refills: 0 | Status: SHIPPED | OUTPATIENT
Start: 2023-11-01

## 2023-11-01 RX ORDER — HYDROXYZINE HYDROCHLORIDE 25 MG/1
25 TABLET, FILM COATED ORAL
Qty: 30 TABLET | Refills: 1 | Status: SHIPPED | OUTPATIENT
Start: 2023-11-01

## 2023-11-01 NOTE — ASSESSMENT & PLAN NOTE
He is anxiety and depression is uncontrolled right now but hopefully it will get better on current dose of Lexapro that was started today, side effects discussed. Continue Atarax daily at night as needed. Follow-up in 4 weeks. Continue with behavioral therapy, psychiatry referral was given.

## 2023-11-01 NOTE — PROGRESS NOTES
Assessment/Plan:    Hyperlipidemia  He is on Crestor 5 mg daily, recheck lipid panel. Anxiety with depression  He is anxiety and depression is uncontrolled right now but hopefully it will get better on current dose of Lexapro that was started today, side effects discussed. Continue Atarax daily at night as needed. Follow-up in 4 weeks. Continue with behavioral therapy, psychiatry referral was given. Low back pain with sciatica  Referred to PT. Diagnoses and all orders for this visit:    Hyperlipidemia, unspecified hyperlipidemia type  -     Lipid panel; Future    Anxiety with depression  -     escitalopram (Lexapro) 10 mg tablet; Take 1 tablet (10 mg total) by mouth daily  -     hydrOXYzine HCL (ATARAX) 25 mg tablet; Take 1 tablet (25 mg total) by mouth daily at bedtime as needed for anxiety  -     Ambulatory referral to Psych Services    Low back pain with sciatica, sciatica laterality unspecified, unspecified back pain laterality, unspecified chronicity  -     Ambulatory Referral to Physical Therapy; Future    Encounter for immunization  -     influenza vaccine, quadrivalent, 0.5 mL, preservative-free, for adult and pediatric patients 6 mos+ (AFLURIA, FLUARIX, FLULAVAL, FLUZONE)    Numbness and tingling          Subjective:      Patient ID: Elvira Aggarwal is a 28 y.o. male. Patient came today with his spouse with concerns of worsening anxiety and depression, he also was in the emergency room due to chest pain and his overall work-up was negative. He has history of hyperlipidemia and he is on 5 mg of Crestor right now. The following portions of the patient's history were reviewed and updated as appropriate: allergies, current medications, past family history, past medical history, past social history, past surgical history, and problem list.    Review of Systems   Constitutional:  Negative for chills and fever.    Psychiatric/Behavioral:  Positive for decreased concentration, dysphoric mood and sleep disturbance. Negative for agitation, behavioral problems, confusion, hallucinations, self-injury and suicidal ideas. The patient is nervous/anxious. The patient is not hyperactive. Objective:      /72 (BP Location: Left arm, Patient Position: Sitting, Cuff Size: Large)   Pulse 75   Wt 79.8 kg (176 lb)   SpO2 96%   BMI 23.87 kg/m²     Allergies   Allergen Reactions    Bee Venom      Hives, resp distress          Current Outpatient Medications:     acetaminophen (TYLENOL) 500 mg tablet, Take 1,000 mg by mouth if needed for mild pain, Disp: , Rfl:     albuterol (PROVENTIL HFA,VENTOLIN HFA) 90 mcg/act inhaler, Inhale 2 puffs every 6 (six) hours as needed, Disp: , Rfl:     escitalopram (Lexapro) 10 mg tablet, Take 1 tablet (10 mg total) by mouth daily, Disp: 90 tablet, Rfl: 0    hydrOXYzine HCL (ATARAX) 25 mg tablet, Take 1 tablet (25 mg total) by mouth daily at bedtime as needed for anxiety, Disp: 30 tablet, Rfl: 1    ibuprofen (MOTRIN) 200 mg tablet, Take 600 mg by mouth every 6 (six) hours as needed for mild pain, Disp: , Rfl:     loratadine (CLARITIN) 10 mg tablet, Take 10 mg by mouth if needed, Disp: , Rfl:     rosuvastatin (CRESTOR) 5 mg tablet, Take 1 tablet (5 mg total) by mouth daily, Disp: 90 tablet, Rfl: 1    triamcinolone (KENALOG) 0.1 % cream, Apply topically 2 (two) times a day (Patient not taking: Reported on 9/27/2023), Disp: 30 g, Rfl: 0     Patient Instructions   Please do not google any health-related topics. You can continue to watch [employer] or [employer] it is okay. Please start Lexapro 10 mg daily in the morning, follow-up in 4 weeks. Recheck your cholesterol level before next appointment. Please start to exercise I will send prescription to your pharmacy, hold to the gym at least twice a week start with cardio like cycling or elliptical.             Physical Exam  Constitutional:       General: He is not in acute distress.      Appearance: He is not ill-appearing or toxic-appearing. HENT:      Nose: No congestion or rhinorrhea. Mouth/Throat:      Pharynx: Posterior oropharyngeal erythema present. No oropharyngeal exudate. Cardiovascular:      Rate and Rhythm: Normal rate. Pulses: Normal pulses.    Pulmonary:      Effort: Pulmonary effort is normal.

## 2023-11-01 NOTE — PATIENT INSTRUCTIONS
Please do not google any health-related topics. You can continue to watch [employer] or [employer] it is okay. Please start Lexapro 10 mg daily in the morning, follow-up in 4 weeks. Recheck your cholesterol level before next appointment.     Please start to exercise I will send prescription to your pharmacy, hold to the gym at least twice a week start with cardio like cycling or elliptical.

## 2023-11-16 ENCOUNTER — HOSPITAL ENCOUNTER (OUTPATIENT)
Facility: MEDICAL CENTER | Age: 33
Discharge: HOME | End: 2023-11-16
Payer: COMMERCIAL

## 2023-11-16 DIAGNOSIS — R20.0 NUMBNESS AND TINGLING: ICD-10-CM

## 2023-11-16 DIAGNOSIS — R20.2 NUMBNESS AND TINGLING: ICD-10-CM

## 2023-11-16 PROCEDURE — 72156 MRI NECK SPINE W/O & W/DYE: CPT

## 2023-11-16 PROCEDURE — A9585 GADOBUTROL INJECTION: HCPCS | Performed by: FAMILY MEDICINE

## 2023-11-16 PROCEDURE — G1004 CDSM NDSC: HCPCS

## 2023-11-16 RX ORDER — GADOBUTROL 604.72 MG/ML
8 INJECTION INTRAVENOUS
Status: COMPLETED | OUTPATIENT
Start: 2023-11-16 | End: 2023-11-16

## 2023-11-16 RX ADMIN — GADOBUTROL 8 ML: 604.72 INJECTION INTRAVENOUS at 17:20

## 2023-11-21 ENCOUNTER — TELEPHONE (OUTPATIENT)
Dept: PSYCHIATRY | Facility: CLINIC | Age: 33
End: 2023-11-21

## 2023-11-23 DIAGNOSIS — F41.8 ANXIETY WITH DEPRESSION: ICD-10-CM

## 2023-11-23 RX ORDER — HYDROXYZINE HYDROCHLORIDE 25 MG/1
25 TABLET, FILM COATED ORAL
Qty: 90 TABLET | Refills: 1 | Status: SHIPPED | OUTPATIENT
Start: 2023-11-23

## 2023-11-29 ENCOUNTER — EVALUATION (OUTPATIENT)
Dept: PHYSICAL THERAPY | Facility: MEDICAL CENTER | Age: 33
End: 2023-11-29
Payer: COMMERCIAL

## 2023-11-29 DIAGNOSIS — M54.40 LOW BACK PAIN WITH SCIATICA, SCIATICA LATERALITY UNSPECIFIED, UNSPECIFIED BACK PAIN LATERALITY, UNSPECIFIED CHRONICITY: Primary | ICD-10-CM

## 2023-11-29 PROCEDURE — 97110 THERAPEUTIC EXERCISES: CPT | Performed by: PHYSICAL THERAPIST

## 2023-11-29 PROCEDURE — 97140 MANUAL THERAPY 1/> REGIONS: CPT | Performed by: PHYSICAL THERAPIST

## 2023-11-29 PROCEDURE — 97161 PT EVAL LOW COMPLEX 20 MIN: CPT | Performed by: PHYSICAL THERAPIST

## 2023-11-29 NOTE — PROGRESS NOTES
PT Evaluation     Today's date: 2023  Patient name: Jessica Milner  : 1990  MRN: [id number]  Referring provider: Rufina Hall  Dx:   Encounter Diagnosis   Name Primary? Low back pain with sciatica, sciatica laterality unspecified, unspecified back pain laterality, unspecified chronicity                   Assessment  Assessment details: Jessica Milner is a 28 y.o. y/o male who presents with complaints of chronic back pain. The patient's greatest concern is not being able to perform daily activities without pain. No further referral appears necessary at this time based upon examination results. Primary movement impairment diagnosis of lumbar hypomobility, resulting in pathoanatomical symptoms of low back pain with sciatica, which limits his ability to perform functional activities without pain. Pt. will benefit from skilled PT services that includes manual therapy techniques to enhance tissue extensibility, neuromuscular re-education to facilitate motor control, therapeutic exercise to increase functional mobility, and modalities prn to reduce pain and inflammation.    Impairments: abnormal muscle firing, abnormal or restricted ROM, activity intolerance, impaired physical strength, lacks appropriate home exercise program, pain with function, poor posture  and poor body mechanics  Understanding of Dx/Px/POC: good   Prognosis: good    Goals  Impairment Goals  - Pt I with initial HEP in 1-2 visits  - Improve ROM to WNL in 4-6 weeks  - Increase strength to 5/5 in all affected areas in 4-6 weeks    Functional Goals  - Increase Functional Status Measure to: 77 in 6-8 weeks  - Patient will be independent with comprehensive HEP in 6-8 weeks  - Patient will be able to golf without pain at time of discharge    Plan  Patient would benefit from: PT eval  Planned modality interventions: thermotherapy: hydrocollator packs  Planned therapy interventions: joint mobilization, manual therapy, neuromuscular re-education, patient education, strengthening, stretching, therapeutic exercise, therapeutic activities, home exercise program, graded exercise, graded activity, flexibility, abdominal trunk stabilization and body mechanics training  Frequency: 1-2x/week. Duration in weeks: 6  Treatment plan discussed with: patient  Subjective Evaluation    History of Present Illness  Mechanism of injury: Patient reports 3-4 years of chronic back pain. Patient reports central low back pain, as well as, pain between the shoulder blades. He reports mild relief from going to the chiropractor. Patient reports intermittent, mild pain in the hands and feet. He does not have radiating pain today. He is seeing a neurologist.  Patient denies changes in bowel/bladder. Patient works at [employer]. He enjoys golfing and would like to be able to perform recreational activities without pain. Patient Goals  Patient goal: Patient would like to be able to golf, walk, and run without pain. Pain  Current pain ratin  At best pain ratin  Pain scale at highest: 8.5. Location: Central low back and in between shoulder blades  Quality: dull ache and sharp  Relieving factors: heat and medications (massage; Tylenol)  Exacerbated by: High stress activity (golf)      Diagnostic Tests  Abnormal MRI: Cervical: Mild noncompressive degenerative discogenic disease. Treatments  Previous treatment: chiropractic  Current treatment: physical therapy    Objective     Concurrent Complaints  Negative for night pain, disturbed sleep, bladder dysfunction, bowel dysfunction, saddle (S4) numbness, cardiac problem, kidney problem, gallbladder problem, stomach problem, ulcer, appendix problem, spleen problem, pancreas problem, history of cancer, history of trauma and infection    Postural Observations  Seated posture: fair      Palpation   Left   Tenderness of the lumbar paraspinals. Right   Tenderness of the lumbar paraspinals.      Neurological Testing Sensation     Lumbar   Left   Intact: light touch    Right   Intact: light touch    Reflexes   Left   Patellar (L4): normal (2+)  Achilles (S1): normal (2+)    Right   Patellar (L4): normal (2+)  Achilles (S1): normal (2+)    Active Range of Motion   Cervical/Thoracic Spine       Thoracic    Flexion:  WFL  Extension:  Restriction level: moderate  Left lateral flexion:  Restriction level: minimal  Right lateral flexion:  Restriction level: minimal  Left rotation:  Restriction level: minimal  Right rotation:  Restriction level: minimal    Lumbar   Flexion:  with pain Restriction level: moderate  Extension:  Restriction level: minimal  Left lateral flexion:  Restriction level: minimal  Right lateral flexion:  Restriction level: minimal  Left rotation:  Restriction level: minimal  Right rotation:  Restriction level: minimal    Passive Range of Motion     Additional Passive Range of Motion Details  PAROM:  Pain and hypomobility at L4-S1 bilaterally (L > R) c/ UPAs. Strength/Myotome Testing     Lumbar   Left   Heel walk: normal  Toe walk: normal    Right   Heel walk: normal  Toe walk: normal    Left Hip   Planes of Motion   Flexion: 5  Extension: 4-  Abduction: 5  Adduction: 4-    Right Hip   Planes of Motion   Flexion: 5  Extension: 4-  Abduction: 5  Adduction: 4-    Left Knee   Flexion: 5  Extension: 5    Right Knee   Flexion: 5  Extension: 5    Left Ankle/Foot   Dorsiflexion: 5  Plantar flexion: 5  Great toe extension: 5    Right Ankle/Foot   Dorsiflexion: 5  Plantar flexion: 5  Great toe extension: 5    Muscle Activation     Additional Muscle Activation Details  Patient demonstrates poor coordination c/ TA/multifidus co-contraction. Tests     Lumbar     Left   Negative passive SLR. Right   Negative passive SLR.      General Comments:    Lower quarter screen   Hips: unremarkable       Precautions: N/A      Manuals 11/29            L4-S1 UPAs bilaterally [place] Neuro Re-Ed             TA isometrics                                                                                           Ther Ex             Prone press ups 10x            LTR 20x5s            Sup Hamstring str 3x30s            Sup piriformis str 3x30s                                                                Ther Activity                                       Gait Training                                       Modalities             CELINA Clayton, PT  11/29/2023,3:43 PM

## 2023-11-30 ENCOUNTER — APPOINTMENT (OUTPATIENT)
Dept: LAB | Facility: MEDICAL CENTER | Age: 33
End: 2023-11-30
Payer: COMMERCIAL

## 2023-11-30 DIAGNOSIS — R20.0 NUMBNESS AND TINGLING: ICD-10-CM

## 2023-11-30 DIAGNOSIS — R20.2 NUMBNESS AND TINGLING: ICD-10-CM

## 2023-11-30 DIAGNOSIS — E78.5 HYPERLIPIDEMIA, UNSPECIFIED HYPERLIPIDEMIA TYPE: ICD-10-CM

## 2023-11-30 LAB
BUN SERPL-MCNC: 13 MG/DL (ref 5–25)
CHOLEST SERPL-MCNC: 208 MG/DL
CREAT SERPL-MCNC: 0.89 MG/DL (ref 0.6–1.3)
GFR SERPL CREATININE-BSD FRML MDRD: 111 ML/MIN/1.73SQ M
HDLC SERPL-MCNC: 62 MG/DL
LDLC SERPL CALC-MCNC: 130 MG/DL (ref 0–100)
NONHDLC SERPL-MCNC: 146 MG/DL
TRIGL SERPL-MCNC: 81 MG/DL

## 2023-11-30 PROCEDURE — 84520 ASSAY OF UREA NITROGEN: CPT

## 2023-11-30 PROCEDURE — 80061 LIPID PANEL: CPT

## 2023-11-30 PROCEDURE — 36415 COLL VENOUS BLD VENIPUNCTURE: CPT

## 2023-11-30 PROCEDURE — 82565 ASSAY OF CREATININE: CPT

## 2023-12-07 ENCOUNTER — OFFICE VISIT (OUTPATIENT)
Dept: FAMILY MEDICINE CLINIC | Facility: CLINIC | Age: 33
End: 2023-12-07
Payer: COMMERCIAL

## 2023-12-07 VITALS
BODY MASS INDEX: 24.38 KG/M2 | TEMPERATURE: 97.3 F | HEIGHT: 72 IN | HEART RATE: 62 BPM | OXYGEN SATURATION: 98 % | SYSTOLIC BLOOD PRESSURE: 100 MMHG | WEIGHT: 180 LBS | DIASTOLIC BLOOD PRESSURE: 70 MMHG

## 2023-12-07 DIAGNOSIS — E78.5 HYPERLIPIDEMIA, UNSPECIFIED HYPERLIPIDEMIA TYPE: Primary | ICD-10-CM

## 2023-12-07 DIAGNOSIS — F41.8 ANXIETY WITH DEPRESSION: ICD-10-CM

## 2023-12-07 PROCEDURE — 99214 OFFICE O/P EST MOD 30 MIN: CPT | Performed by: INTERNAL MEDICINE

## 2023-12-07 RX ORDER — ESCITALOPRAM OXALATE 10 MG/1
10 TABLET ORAL DAILY
Qty: 90 TABLET | Refills: 2 | Status: SHIPPED | OUTPATIENT
Start: 2023-12-07

## 2023-12-07 NOTE — PROGRESS NOTES
Assessment/Plan:    Anxiety with depression  Doing much better on current dose of Atarax and Lexapro, he tolerates medications well right now. Will continue the same at least for 6 months. Continue with regular exercise and healthy diet. Hyperlipidemia  Cholesterol level improved dramatically. Continue current dose of Crestor. Diagnoses and all orders for this visit:    Hyperlipidemia, unspecified hyperlipidemia type    Anxiety with depression  -     escitalopram (Lexapro) 10 mg tablet; Take 1 tablet (10 mg total) by mouth daily          Subjective:      Patient ID: Chente Barrow is a 28 y.o. male. Came today for follow-up on his chronic medical problems. The following portions of the patient's history were reviewed and updated as appropriate: allergies, current medications, past family history, past medical history, past social history, past surgical history, and problem list.    Review of Systems   Constitutional:  Negative for chills and fever. Psychiatric/Behavioral:  Positive for dysphoric mood and sleep disturbance. Negative for agitation, behavioral problems, confusion, decreased concentration, hallucinations, self-injury and suicidal ideas. The patient is not nervous/anxious and is not hyperactive.           Objective:      /70 (BP Location: Left arm, Patient Position: Sitting, Cuff Size: Large)   Pulse 62   Temp (!) 97.3 °F (36.3 °C) (Tympanic)   Ht 6' (1.829 m)   Wt 81.6 kg (180 lb)   SpO2 98%   BMI 24.41 kg/m²     Allergies   Allergen Reactions    Bee Venom      Hives, resp distress          Current Outpatient Medications:     acetaminophen (TYLENOL) 500 mg tablet, Take 1,000 mg by mouth if needed for mild pain, Disp: , Rfl:     albuterol (PROVENTIL HFA,VENTOLIN HFA) 90 mcg/act inhaler, Inhale 2 puffs every 6 (six) hours as needed, Disp: , Rfl:     escitalopram (Lexapro) 10 mg tablet, Take 1 tablet (10 mg total) by mouth daily, Disp: 90 tablet, Rfl: 2    hydrOXYzine HCL (ATARAX) 25 mg tablet, TAKE 1 TABLET (25 MG TOTAL) BY MOUTH DAILY AT BEDTIME AS NEEDED FOR ANXIETY, Disp: 90 tablet, Rfl: 1    loratadine (CLARITIN) 10 mg tablet, Take 10 mg by mouth if needed, Disp: , Rfl:     rosuvastatin (CRESTOR) 5 mg tablet, Take 1 tablet (5 mg total) by mouth daily, Disp: 90 tablet, Rfl: 1    triamcinolone (KENALOG) 0.1 % cream, Apply topically 2 (two) times a day (Patient not taking: Reported on 9/27/2023), Disp: 30 g, Rfl: 0     Patient Instructions   Start Flonase 2 sprays each nostril at night every day. Generic name is fluticasone nasal spray. Get it from [address] [place] [address].    Continue rosuvastatin at current dose  Cholesterol is getting significantly better. Physical Exam  Cardiovascular:      Rate and Rhythm: Normal rate. Pulmonary:      Breath sounds: No rales.    Psychiatric:         Mood and Affect: Mood normal.         Behavior: Behavior normal.

## 2023-12-07 NOTE — PATIENT INSTRUCTIONS
Start Flonase 2 sprays each nostril at night every day. Generic name is fluticasone nasal spray. Get it from [address] [place] [address].    Continue rosuvastatin at current dose  Cholesterol is getting significantly better.

## 2023-12-07 NOTE — ASSESSMENT & PLAN NOTE
Doing much better on current dose of Atarax and Lexapro, he tolerates medications well right now. Will continue the same at least for 6 months. Continue with regular exercise and healthy diet.

## 2023-12-11 ENCOUNTER — OFFICE VISIT (OUTPATIENT)
Dept: PHYSICAL THERAPY | Facility: MEDICAL CENTER | Age: 33
End: 2023-12-11
Payer: COMMERCIAL

## 2023-12-11 DIAGNOSIS — M54.40 LOW BACK PAIN WITH SCIATICA, SCIATICA LATERALITY UNSPECIFIED, UNSPECIFIED BACK PAIN LATERALITY, UNSPECIFIED CHRONICITY: Primary | ICD-10-CM

## 2023-12-11 PROCEDURE — 97140 MANUAL THERAPY 1/> REGIONS: CPT | Performed by: PHYSICAL THERAPIST

## 2023-12-11 PROCEDURE — 97110 THERAPEUTIC EXERCISES: CPT | Performed by: PHYSICAL THERAPIST

## 2023-12-11 NOTE — PROGRESS NOTES
Daily Note     Today's date: 2023  Patient name: Rossy Bergman  : 1990  MRN: [id number]  Referring provider: Daya Dill  Dx:   Encounter Diagnosis     ICD-10-CM    1. Low back pain with sciatica, sciatica laterality unspecified, unspecified back pain laterality, unspecified chronicity  M54.40                      Subjective: Patient states that he is doing well. Objective: See treatment diary below. Assessment:  Patient demonstrates good tolerance to tx plan c/ decreased back pain. Tolerated treatment well. Patient would benefit from continued PT      Plan: Continue per plan of care.       Precautions: N/A      Manuals            L4-S1 UPAs bilaterally [place]                                                  Neuro Re-Ed             TA isometrics                                                                                           Ther Ex             Prone press ups 10x 2x10           LTR 20x5s 20x5s           Sup Hamstring str 3x30s 3x30s           Sup piriformis str 3x30s 3x30s                                                               Ther Activity                                       Gait Training                                       Modalities               10'

## 2023-12-18 ENCOUNTER — OFFICE VISIT (OUTPATIENT)
Dept: PHYSICAL THERAPY | Facility: MEDICAL CENTER | Age: 33
End: 2023-12-18
Payer: COMMERCIAL

## 2023-12-18 DIAGNOSIS — M54.40 LOW BACK PAIN WITH SCIATICA, SCIATICA LATERALITY UNSPECIFIED, UNSPECIFIED BACK PAIN LATERALITY, UNSPECIFIED CHRONICITY: Primary | ICD-10-CM

## 2023-12-18 PROCEDURE — 97110 THERAPEUTIC EXERCISES: CPT | Performed by: PHYSICAL THERAPIST

## 2023-12-18 PROCEDURE — 97140 MANUAL THERAPY 1/> REGIONS: CPT | Performed by: PHYSICAL THERAPIST

## 2023-12-18 PROCEDURE — 97112 NEUROMUSCULAR REEDUCATION: CPT | Performed by: PHYSICAL THERAPIST

## 2023-12-18 NOTE — PROGRESS NOTES
Daily Note     Today's date: 2023  Patient name: Jose Bowman  : 1990  MRN: [id number]  Referring provider: Mukund Gonzalez*  Dx:   Encounter Diagnosis     ICD-10-CM    1. Low back pain with sciatica, sciatica laterality unspecified, unspecified back pain laterality, unspecified chronicity  M54.40                      Subjective:  Patient states that he feels better.      Objective: See treatment diary below.      Assessment:  Patient demonstrates good tolerance to progressions without pain.  Tolerated treatment well. Patient would benefit from continued PT.      Plan: Continue per plan of care.      Precautions: N/A      Manuals           L4-S1 UPAs bilaterally [place]                                                 Neuro Re-Ed             TA isometrics   20x5s                                                                                        Ther Ex             Prone press ups 10x 2x10 2x10          LTR 20x5s 20x5s 20x5s          Sup Hamstring str 3x30s 3x30s 3x30s          Sup piriformis str 3x30s 3x30s 3x30s          Hip add   20x5s          Bridges   20x5s                                    Ther Activity                                       Gait Training                                       Modalities               10' 10'

## 2024-01-04 ENCOUNTER — OFFICE VISIT (OUTPATIENT)
Dept: PHYSICAL THERAPY | Facility: MEDICAL CENTER | Age: 34
End: 2024-01-04
Payer: COMMERCIAL

## 2024-01-04 DIAGNOSIS — M54.40 LOW BACK PAIN WITH SCIATICA, SCIATICA LATERALITY UNSPECIFIED, UNSPECIFIED BACK PAIN LATERALITY, UNSPECIFIED CHRONICITY: Primary | ICD-10-CM

## 2024-01-04 PROCEDURE — 97110 THERAPEUTIC EXERCISES: CPT | Performed by: PHYSICAL THERAPIST

## 2024-01-04 PROCEDURE — 97140 MANUAL THERAPY 1/> REGIONS: CPT | Performed by: PHYSICAL THERAPIST

## 2024-01-04 PROCEDURE — 97112 NEUROMUSCULAR REEDUCATION: CPT | Performed by: PHYSICAL THERAPIST

## 2024-01-04 NOTE — PROGRESS NOTES
Daily Note     Today's date: 2024  Patient name: Jose Bowman  : 1990  MRN: [id number]  Referring provider: Mukund Gonzalez*  Dx:   Encounter Diagnosis     ICD-10-CM    1. Low back pain with sciatica, sciatica laterality unspecified, unspecified back pain laterality, unspecified chronicity  M54.40                      Subjective:  Patient states that he feels better.      Objective: See treatment diary below.      Assessment:  Jose Bowman has been compliant with attending PT and home exercise program since initial eval.  Jose  has made improvements in objective data since initial evalulation and has achieved all goals.  Patient reports having returned to their prior level or function. Patient provided with updated Home Exercise Program, all questions answered, verbalized understanding and agreement to plan of care. Thus it was mutually decided to discontinue this episode of care and transition to Home Exercise Program.      Plan:  d/c to HEP.     Precautions: N/A      Manuals          L4-S1 UPAs bilaterally [place]                                                Neuro Re-Ed             TA isometrics   20x5s 20x5s                                                                                       Ther Ex             Prone press ups 10x 2x10 2x10 3x10         LTR 20x5s 20x5s 20x5s 20x5s         Sup Hamstring str 3x30s 3x30s 3x30s 3x30s         Sup piriformis str 3x30s 3x30s 3x30s 3x30s         Hip add   20x5s 20x5s         Bridges   20x5s 20x c/ ball                                   Ther Activity                                       Gait Training                                       Modalities               10' 10' 10'

## 2024-01-05 DIAGNOSIS — E78.5 HYPERLIPIDEMIA, UNSPECIFIED HYPERLIPIDEMIA TYPE: ICD-10-CM

## 2024-01-05 RX ORDER — ROSUVASTATIN CALCIUM 5 MG/1
5 TABLET, COATED ORAL DAILY
Qty: 90 TABLET | Refills: 0 | Status: SHIPPED | OUTPATIENT
Start: 2024-01-05

## 2024-01-05 RX ORDER — ROSUVASTATIN CALCIUM 5 MG/1
5 TABLET, COATED ORAL DAILY
Qty: 90 TABLET | Refills: 0 | OUTPATIENT
Start: 2024-01-05

## 2024-01-12 ENCOUNTER — APPOINTMENT (OUTPATIENT)
Dept: PHYSICAL THERAPY | Facility: MEDICAL CENTER | Age: 34
End: 2024-01-12
Payer: COMMERCIAL

## 2024-01-19 ENCOUNTER — APPOINTMENT (OUTPATIENT)
Dept: PHYSICAL THERAPY | Facility: MEDICAL CENTER | Age: 34
End: 2024-01-19
Payer: COMMERCIAL

## 2024-01-26 ENCOUNTER — APPOINTMENT (OUTPATIENT)
Dept: PHYSICAL THERAPY | Facility: MEDICAL CENTER | Age: 34
End: 2024-01-26
Payer: COMMERCIAL

## 2024-01-29 ENCOUNTER — APPOINTMENT (OUTPATIENT)
Dept: PHYSICAL THERAPY | Facility: MEDICAL CENTER | Age: 34
End: 2024-01-29
Payer: COMMERCIAL

## 2024-01-29 ENCOUNTER — EVALUATION (OUTPATIENT)
Dept: PHYSICAL THERAPY | Facility: MEDICAL CENTER | Age: 34
End: 2024-01-29
Payer: COMMERCIAL

## 2024-01-29 DIAGNOSIS — M54.40 LOW BACK PAIN WITH SCIATICA, SCIATICA LATERALITY UNSPECIFIED, UNSPECIFIED BACK PAIN LATERALITY, UNSPECIFIED CHRONICITY: Primary | ICD-10-CM

## 2024-01-29 PROCEDURE — 97110 THERAPEUTIC EXERCISES: CPT | Performed by: PHYSICAL THERAPIST

## 2024-01-29 PROCEDURE — 97164 PT RE-EVAL EST PLAN CARE: CPT | Performed by: PHYSICAL THERAPIST

## 2024-01-29 PROCEDURE — 97140 MANUAL THERAPY 1/> REGIONS: CPT | Performed by: PHYSICAL THERAPIST

## 2024-01-29 NOTE — PROGRESS NOTES
Re-Eval    Today's date: 2024  Patient name: Jose Bowman  : 1990  MRN: [id number]  Referring provider: Mukund Gonzalez*  Dx:   Encounter Diagnosis     ICD-10-CM    1. Low back pain with sciatica, sciatica laterality unspecified, unspecified back pain laterality, unspecified chronicity  M54.40                        Assessment  Assessment details: Jose Bowman is a 32 y/o male who presents with complaints of acute on chronic low back pain.  The patient's greatest concern is pain after moving furniture over the weekend.  No further referral appears necessary at this time based upon examination results.    Primary movement impairment diagnosis of lumbar hypomobility, resulting in pathoanatomical symptoms of low back pain with sciatica, which limits his ability to perform functional activities without pain.  Pt. will benefit from skilled PT services that includes manual therapy techniques to enhance tissue extensibility, neuromuscular re-education to facilitate motor control, therapeutic exercise to increase functional mobility, and modalities prn to reduce pain and inflammation.     Impairments: abnormal muscle firing, abnormal or restricted ROM, activity intolerance, impaired physical strength, lacks appropriate home exercise program, pain with function, poor posture  and poor body mechanics  Understanding of Dx/Px/POC: good   Prognosis: good    Goals  Impairment Goals  - Pt I with initial HEP in 1-2 visits  - Improve ROM to WNL in 4-6 weeks  - Increase strength to 5/5 in all affected areas in 4-6 weeks    Functional Goals  - Increase Functional Status Measure to: 77 in 6-8 weeks  - Patient will be independent with comprehensive HEP in 6-8 weeks  - Patient will be able to golf without pain at time of discharge    Plan  Patient would benefit from: PT re-eval  Planned modality interventions: thermotherapy: hydrocollator packs  Planned therapy interventions: joint mobilization, manual therapy,  neuromuscular re-education, patient education, strengthening, stretching, therapeutic exercise, therapeutic activities, home exercise program, graded exercise, graded activity, flexibility, abdominal trunk stabilization and body mechanics training  Frequency: 1-2x/week.  Duration in weeks: 6  Treatment plan discussed with: patient  Subjective Evaluation    History of Present Illness  Mechanism of injury: Patient reports that he experienced severe back pain yesterday morning after moving furniture the day before.  He reports pain that travels to his buttocks bilaterally.  He denies changes in bowel/bladder.  He denies LE paresthesias.  He recently completed physical therapy with much relief and would like to address his current symptoms.  Patient works at [employer], which involves a lot of driving.  He enjoys golfing and would like to be able to perform recreational activities without pain.  Patient Goals  Patient goal: Patient would like to be able to golf, walk, and run without pain.  Pain  Current pain ratin    Diagnostic Tests  Abnormal MRI: Cervical: Mild noncompressive degenerative discogenic disease.  Treatments  Previous treatment: chiropractic  Current treatment: physical therapy    Objective     Concurrent Complaints  Negative for night pain, disturbed sleep, bladder dysfunction, bowel dysfunction, saddle (S4) numbness, cardiac problem, kidney problem, gallbladder problem, stomach problem, ulcer, appendix problem, spleen problem, pancreas problem, history of cancer, history of trauma and infection    Postural Observations  Seated posture: fair      Palpation   Left   Tenderness of the lumbar paraspinals.     Right   Tenderness of the lumbar paraspinals.     Neurological Testing     Sensation     Lumbar   Left   Intact: light touch    Right   Intact: light touch    Reflexes   Left   Patellar (L4): normal (2+)  Achilles (S1): normal (2+)    Right   Patellar (L4): normal (2+)  Achilles (S1): normal  (2+)    Active Range of Motion   Cervical/Thoracic Spine     Lumbar   Flexion:  with pain Restriction level: moderate  Extension:  Restriction level: moderate c/ pain  Left lateral flexion:  Restriction level: minimal  Right lateral flexion:  Restriction level: minimal  Left rotation:  Restriction level: moderate  Right rotation:  Restriction level: minimal    Passive Range of Motion     Additional Passive Range of Motion Details  PAROM:  Pain and hypomobility at L4-S1 bilaterally (L > R) c/ UPAs.    Strength/Myotome Testing     Lumbar   Left   Heel walk: normal  Toe walk: normal    Right   Heel walk: normal  Toe walk: normal    Left Hip   Planes of Motion   Flexion: 5    Right Hip   Planes of Motion   Flexion: 5    Left Knee   Flexion: 5  Extension: 5    Right Knee   Flexion: 5  Extension: 5    Left Ankle/Foot   Dorsiflexion: 5  Plantar flexion: 5  Great toe extension: 5    Right Ankle/Foot   Dorsiflexion: 5  Plantar flexion: 5  Great toe extension: 5    Muscle Activation     Additional Muscle Activation Details  Patient demonstrates poor coordination c/ TA/multifidus co-contraction.    Tests     Lumbar     Left   Negative passive SLR.     Right   Negative passive SLR.     General Comments:    Lower quarter screen   Hips: unremarkable     Precautions: N/A      Manuals 11/29 12/11 12/18 1/4 1/29        L4-S1 UPAs bilaterally AZ AZ AZ AZ AZ                                               Neuro Re-Ed             TA isometrics   20x5s 20x5s                                                                                       Ther Ex     HEP reveiew 10'        Prone press ups 10x 2x10 2x10 3x10 2x10        LTR 20x5s 20x5s 20x5s 20x5s         Sup Hamstring str 3x30s 3x30s 3x30s 3x30s         Sup piriformis str 3x30s 3x30s 3x30s 3x30s         Hip add   20x5s 20x5s         Bridges   20x5s 20x c/ ball                                   Ther Activity                                       Gait Training                                        Modalities               10' 10' 10' 10' prone

## 2024-02-13 ENCOUNTER — APPOINTMENT (OUTPATIENT)
Dept: PHYSICAL THERAPY | Facility: MEDICAL CENTER | Age: 34
End: 2024-02-13
Payer: COMMERCIAL

## 2024-02-19 ENCOUNTER — OFFICE VISIT (OUTPATIENT)
Dept: PHYSICAL THERAPY | Facility: MEDICAL CENTER | Age: 34
End: 2024-02-19
Payer: COMMERCIAL

## 2024-02-19 DIAGNOSIS — M54.40 LOW BACK PAIN WITH SCIATICA, SCIATICA LATERALITY UNSPECIFIED, UNSPECIFIED BACK PAIN LATERALITY, UNSPECIFIED CHRONICITY: Primary | ICD-10-CM

## 2024-02-19 PROCEDURE — 97110 THERAPEUTIC EXERCISES: CPT | Performed by: PHYSICAL THERAPIST

## 2024-02-19 PROCEDURE — 97112 NEUROMUSCULAR REEDUCATION: CPT | Performed by: PHYSICAL THERAPIST

## 2024-02-19 PROCEDURE — 97140 MANUAL THERAPY 1/> REGIONS: CPT | Performed by: PHYSICAL THERAPIST

## 2024-02-19 NOTE — PROGRESS NOTES
Daily Note     Today's date: 2024  Patient name: Jose Bowman  : 1990  MRN: [id number]  Referring provider: Mukund Gonzalez*  Dx:   Encounter Diagnosis     ICD-10-CM    1. Low back pain with sciatica, sciatica laterality unspecified, unspecified back pain laterality, unspecified chronicity  M54.40                      Subjective:  Patient states that he was feeling better and then he pulled his back again.      Objective: See treatment diary below.      Assessment:  Patient presents c/ right sided low back pain that radiates to the posterior thigh above the knee.  This did improve after lv, but it was exacerbated this weekend again upon bending forward.  Pt education on HEP to be performed regularly d/t prolonged sitting.  Tolerated treatment well. Patient would benefit from continued PT.      Plan: Continue per plan of care.      Precautions: N/A      Manuals        L4-S1 UPAs bilaterally AZ AZ AZ AZ AZ AZ                                              Neuro Re-Ed             TA isometrics   20x5s 20x5s  20x5s                                                                                     Ther Ex     HEP reveiew 10'        Prone press ups 10x 2x10 2x10 3x10 2x10 3x10       SKTC      20x5s       LTR 20x5s 20x5s 20x5s 20x5s  20x5s       Sup Hamstring str 3x30s 3x30s 3x30s 3x30s         Sup piriformis str 3x30s 3x30s 3x30s 3x30s  3x30s       Hip add   20x5s 20x5s  20x5s       Hip abd      20x5s blue       Bridges   20x5s 20x c/ ball  2x10                                 Ther Activity                                       Gait Training                                       Modalities             MH  10' 10' 10' 10' prone 10' prone

## 2024-02-27 NOTE — Clinical Note
----- Message from Magno Garcia DO sent at 2/26/2024  9:04 AM CST -----  Please inform patient blood testing for DVT is negative.  She should still complete the ultrasound when she can.   Nic Martinez was seen and treated in our emergency department on 5/23/2022  No restrictions    Other - See Comments    N/A    Diagnosis: Abdominal pain, nausea, vomiting    Duke Jonas  is off the rest of the shift today  He may return on this date: 05/25/2022         If you have any questions or concerns, please don't hesitate to call        Maribeth Rebollar MD    ______________________________           _______________          _______________  [facility] Representative                              Date                                Time

## 2024-04-03 DIAGNOSIS — E78.5 HYPERLIPIDEMIA, UNSPECIFIED HYPERLIPIDEMIA TYPE: ICD-10-CM

## 2024-04-03 RX ORDER — ROSUVASTATIN CALCIUM 5 MG/1
5 TABLET, COATED ORAL DAILY
Qty: 90 TABLET | Refills: 1 | Status: SHIPPED | OUTPATIENT
Start: 2024-04-03

## 2024-05-25 DIAGNOSIS — F41.8 ANXIETY WITH DEPRESSION: ICD-10-CM

## 2024-05-25 RX ORDER — HYDROXYZINE HYDROCHLORIDE 25 MG/1
25 TABLET, FILM COATED ORAL
Qty: 90 TABLET | Refills: 1 | Status: SHIPPED | OUTPATIENT
Start: 2024-05-25

## 2024-06-24 DIAGNOSIS — F41.8 ANXIETY WITH DEPRESSION: ICD-10-CM

## 2024-06-24 DIAGNOSIS — R06.2 WHEEZING: Primary | ICD-10-CM

## 2024-06-25 RX ORDER — HYDROXYZINE HYDROCHLORIDE 25 MG/1
25 TABLET, FILM COATED ORAL
Qty: 90 TABLET | Refills: 0 | OUTPATIENT
Start: 2024-06-25

## 2024-06-25 RX ORDER — ALBUTEROL SULFATE 90 UG/1
2 AEROSOL, METERED RESPIRATORY (INHALATION) EVERY 6 HOURS PRN
Qty: 18 G | Refills: 0 | Status: SHIPPED | OUTPATIENT
Start: 2024-06-25

## 2024-07-19 ENCOUNTER — TELEPHONE (OUTPATIENT)
Age: 34
End: 2024-07-19

## 2024-07-19 NOTE — TELEPHONE ENCOUNTER
Contacted pt. off of Wait List in order to Schedule TT/MM, LVM to contact [phone] option 3 (Intake Team) in order to be scheduled with a Provider.

## 2024-07-23 NOTE — TELEPHONE ENCOUNTER
Contacted patient off of Medication Management  to verify needs of services in attempts to offer patient an appointment. spoke with patient whom stated they were no longer interested. Pt was removed from wait list.

## 2024-09-03 ENCOUNTER — APPOINTMENT (OUTPATIENT)
Dept: LAB | Facility: MEDICAL CENTER | Age: 34
End: 2024-09-03

## 2024-09-03 DIAGNOSIS — Z00.8 HEALTH EXAMINATION IN POPULATION SURVEY: ICD-10-CM

## 2024-09-03 LAB
CHOLEST SERPL-MCNC: 203 MG/DL
EST. AVERAGE GLUCOSE BLD GHB EST-MCNC: 111 MG/DL
HBA1C MFR BLD: 5.5 %
HDLC SERPL-MCNC: 51 MG/DL
LDLC SERPL CALC-MCNC: 108 MG/DL (ref 0–100)
NONHDLC SERPL-MCNC: 152 MG/DL
TRIGL SERPL-MCNC: 222 MG/DL

## 2024-09-03 PROCEDURE — 80061 LIPID PANEL: CPT

## 2024-09-03 PROCEDURE — 83036 HEMOGLOBIN GLYCOSYLATED A1C: CPT

## 2024-09-03 PROCEDURE — 36415 COLL VENOUS BLD VENIPUNCTURE: CPT

## 2024-09-19 DIAGNOSIS — F41.8 ANXIETY WITH DEPRESSION: ICD-10-CM

## 2024-09-19 RX ORDER — ESCITALOPRAM OXALATE 10 MG/1
10 TABLET ORAL DAILY
Qty: 30 TABLET | Refills: 5 | Status: SHIPPED | OUTPATIENT
Start: 2024-09-19

## 2024-10-02 DIAGNOSIS — E78.5 HYPERLIPIDEMIA, UNSPECIFIED HYPERLIPIDEMIA TYPE: ICD-10-CM

## 2024-10-02 DIAGNOSIS — R06.2 WHEEZING: ICD-10-CM

## 2024-10-02 RX ORDER — ALBUTEROL SULFATE 90 UG/1
2 INHALANT RESPIRATORY (INHALATION) EVERY 6 HOURS PRN
Qty: 18 G | Refills: 0 | Status: SHIPPED | OUTPATIENT
Start: 2024-10-02

## 2024-10-02 RX ORDER — ROSUVASTATIN CALCIUM 5 MG/1
5 TABLET, COATED ORAL DAILY
Qty: 90 TABLET | Refills: 1 | Status: SHIPPED | OUTPATIENT
Start: 2024-10-02

## 2024-10-02 NOTE — TELEPHONE ENCOUNTER
Patient called and he is completely out of the rosuvastatin (CRESTOR) 5 mg tablet .  Pls send today to [facility]

## 2024-10-02 NOTE — TELEPHONE ENCOUNTER
Patient called in and would like this med refill to go to [facility] and not [facility] like his other refill.

## 2024-12-21 DIAGNOSIS — F41.8 ANXIETY WITH DEPRESSION: ICD-10-CM

## 2024-12-24 RX ORDER — HYDROXYZINE HYDROCHLORIDE 25 MG/1
25 TABLET, FILM COATED ORAL
Qty: 90 TABLET | Refills: 1 | Status: SHIPPED | OUTPATIENT
Start: 2024-12-24

## 2025-01-03 ENCOUNTER — OFFICE VISIT (OUTPATIENT)
Dept: FAMILY MEDICINE CLINIC | Facility: CLINIC | Age: 35
End: 2025-01-03
Payer: COMMERCIAL

## 2025-01-03 ENCOUNTER — APPOINTMENT (OUTPATIENT)
Dept: LAB | Facility: MEDICAL CENTER | Age: 35
End: 2025-01-03
Payer: COMMERCIAL

## 2025-01-03 VITALS
DIASTOLIC BLOOD PRESSURE: 70 MMHG | BODY MASS INDEX: 26.28 KG/M2 | WEIGHT: 194 LBS | HEART RATE: 68 BPM | SYSTOLIC BLOOD PRESSURE: 110 MMHG | RESPIRATION RATE: 18 BRPM | OXYGEN SATURATION: 99 % | TEMPERATURE: 97.3 F | HEIGHT: 72 IN

## 2025-01-03 DIAGNOSIS — Z28.39 HEPATITIS B VACCINATION NOT UP TO DATE: ICD-10-CM

## 2025-01-03 DIAGNOSIS — L21.9 SEBORRHEIC DERMATITIS: ICD-10-CM

## 2025-01-03 DIAGNOSIS — E78.5 HYPERLIPIDEMIA, UNSPECIFIED HYPERLIPIDEMIA TYPE: ICD-10-CM

## 2025-01-03 DIAGNOSIS — L23.89 ALLERGIC CONTACT DERMATITIS DUE TO OTHER AGENTS: ICD-10-CM

## 2025-01-03 DIAGNOSIS — F41.8 ANXIETY WITH DEPRESSION: ICD-10-CM

## 2025-01-03 DIAGNOSIS — Z00.00 ANNUAL PHYSICAL EXAM: Primary | ICD-10-CM

## 2025-01-03 LAB
ALBUMIN SERPL BCG-MCNC: 4.8 G/DL (ref 3.5–5)
ALP SERPL-CCNC: 54 U/L (ref 34–104)
ALT SERPL W P-5'-P-CCNC: 45 U/L (ref 7–52)
ANION GAP SERPL CALCULATED.3IONS-SCNC: 8 MMOL/L (ref 4–13)
AST SERPL W P-5'-P-CCNC: 32 U/L (ref 13–39)
BILIRUB SERPL-MCNC: 0.47 MG/DL (ref 0.2–1)
BUN SERPL-MCNC: 12 MG/DL (ref 5–25)
CALCIUM SERPL-MCNC: 9.8 MG/DL (ref 8.4–10.2)
CHLORIDE SERPL-SCNC: 100 MMOL/L (ref 96–108)
CHOLEST SERPL-MCNC: 251 MG/DL (ref ?–200)
CO2 SERPL-SCNC: 31 MMOL/L (ref 21–32)
CREAT SERPL-MCNC: 0.88 MG/DL (ref 0.6–1.3)
GFR SERPL CREATININE-BSD FRML MDRD: 111 ML/MIN/1.73SQ M
GLUCOSE P FAST SERPL-MCNC: 90 MG/DL (ref 65–99)
HBV SURFACE AB SER-ACNC: 363 MIU/ML
HDLC SERPL-MCNC: 64 MG/DL
LDLC SERPL CALC-MCNC: 162 MG/DL (ref 0–100)
NONHDLC SERPL-MCNC: 187 MG/DL
POTASSIUM SERPL-SCNC: 4.7 MMOL/L (ref 3.5–5.3)
PROT SERPL-MCNC: 7.5 G/DL (ref 6.4–8.4)
SODIUM SERPL-SCNC: 139 MMOL/L (ref 135–147)
TRIGL SERPL-MCNC: 125 MG/DL (ref ?–150)

## 2025-01-03 PROCEDURE — 36415 COLL VENOUS BLD VENIPUNCTURE: CPT

## 2025-01-03 PROCEDURE — 80053 COMPREHEN METABOLIC PANEL: CPT

## 2025-01-03 PROCEDURE — 80061 LIPID PANEL: CPT

## 2025-01-03 PROCEDURE — 99395 PREV VISIT EST AGE 18-39: CPT | Performed by: INTERNAL MEDICINE

## 2025-01-03 PROCEDURE — 86706 HEP B SURFACE ANTIBODY: CPT

## 2025-01-03 RX ORDER — ESCITALOPRAM OXALATE 5 MG/1
5 TABLET ORAL DAILY
Qty: 90 TABLET | Refills: 0 | Status: SHIPPED | OUTPATIENT
Start: 2025-01-03

## 2025-01-03 RX ORDER — ESCITALOPRAM OXALATE 5 MG/1
5 TABLET ORAL DAILY
Qty: 90 TABLET | Refills: 0 | Status: SHIPPED | OUTPATIENT
Start: 2025-01-03 | End: 2025-01-03

## 2025-01-03 RX ORDER — KETOCONAZOLE 20 MG/ML
1 SHAMPOO, SUSPENSION TOPICAL 2 TIMES WEEKLY
Qty: 120 ML | Refills: 3 | Status: SHIPPED | OUTPATIENT
Start: 2025-01-06

## 2025-01-03 NOTE — PROGRESS NOTES
Name: Jose Bowman      : 1990      MRN: [id number]  Encounter Provider: Mukund Gonzalez MD  Encounter Date: 1/3/2025   Encounter department: [facility]    Assessment & Plan  Allergic contact dermatitis due to other agents         Hyperlipidemia, unspecified hyperlipidemia type    Orders:    Lipid panel; Future    Comprehensive metabolic panel; Future    Hepatitis B vaccination not up to date    Orders:    Hepatitis B surface antibody; Future    Seborrheic dermatitis    Orders:    ketoconazole (NIZORAL) 2 % shampoo; Apply 1 Application topically 2 (two) times a week    Anxiety with depression  Will taper off Lexapro, patient reported that he probably does not need it anymore.  Will taper off over the period of 6 weeks.    Orders:    escitalopram (LEXAPRO) 5 mg tablet; Take 1 tablet (5 mg total) by mouth daily    Annual physical exam              History of Present Illness     Patient came today for annual checkup.  He is up-to-date on his tetanus shot.  He does not smoke.  No family history of prostate cancer or colon cancer.  He is physically active.        Review of Systems   Constitutional:  Negative for chills, fatigue and fever.   Respiratory:  Negative for cough, chest tightness and shortness of breath.    Cardiovascular:  Negative for chest pain, palpitations and leg swelling.   Gastrointestinal:  Negative for abdominal distention, abdominal pain, blood in stool, constipation, diarrhea and nausea.   Genitourinary:  Negative for difficulty urinating and dysuria.   Musculoskeletal:  Negative for arthralgias, back pain, gait problem, joint swelling, myalgias and neck pain.   Skin:  Negative for rash.   Neurological:  Negative for dizziness, weakness, numbness and headaches.   Psychiatric/Behavioral:  Negative for agitation and dysphoric mood. The patient is not nervous/anxious.      Past Medical History:   Diagnosis Date    Anxiety     Rhabdomyolysis      No past  surgical history on file.  Family History   Problem Relation Age of Onset    Crohn's disease Brother     Hyperlipidemia Maternal Aunt     Hyperlipidemia Maternal Uncle      Social History     Tobacco Use    Smoking status: Never    Smokeless tobacco: Never   Vaping Use    Vaping status: Never Used   Substance and Sexual Activity    Alcohol use: Yes     Alcohol/week: 1.0 standard drink of alcohol     Types: 1 Standard drinks or equivalent per week     Comment: On occasion    Drug use: Never    Sexual activity: Yes     Partners: Female     Birth control/protection: None     Current Outpatient Medications on File Prior to Visit   Medication Sig    acetaminophen (TYLENOL) 500 mg tablet Take 1,000 mg by mouth if needed for mild pain    albuterol (PROVENTIL HFA,VENTOLIN HFA) 90 mcg/act inhaler Inhale 2 puffs every 6 (six) hours as needed for wheezing    hydrOXYzine HCL (ATARAX) 25 mg tablet TAKE 1 TABLET (25 MG TOTAL) BY MOUTH DAILY AT BEDTIME AS NEEDED FOR ANXIETY    loratadine (CLARITIN) 10 mg tablet Take 10 mg by mouth if needed    rosuvastatin (CRESTOR) 5 mg tablet Take 1 tablet (5 mg total) by mouth daily    [DISCONTINUED] escitalopram (LEXAPRO) 10 mg tablet TAKE 1 TABLET BY MOUTH EVERY DAY    [DISCONTINUED] triamcinolone (KENALOG) 0.1 % cream Apply topically 2 (two) times a day (Patient not taking: Reported on 1/3/2025)     Allergies   Allergen Reactions    Bee Venom      Hives, resp distress     Immunization History   Administered Date(s) Administered    COVID-19 PFIZER VACCINE 0.3 ML IM 01/05/2021, 01/25/2021, 12/16/2021    Hep B, Adolescent or Pediatric 08/22/1997    INFLUENZA 11/10/2014, 10/01/2015, 10/05/2020, 12/01/2021, 11/04/2022    Influenza Quadrivalent Preservative Free ID 10/09/2020    Influenza, injectable, quadrivalent, preservative free 0.5 mL 11/01/2023    Influenza, seasonal, injectable 10/27/2024    Tdap 06/12/2018, 02/23/2023     Objective   /70 (BP Location: Right arm, Patient Position:  Sitting, Cuff Size: Standard)   Pulse 68   Temp (!) 97.3 °F (36.3 °C) (Tympanic)   Resp 18   Ht 6' (1.829 m)   Wt 88 kg (194 lb)   SpO2 99%   BMI 26.31 kg/m²     Physical Exam  Constitutional:       General: He is not in acute distress.     Appearance: He is not ill-appearing or toxic-appearing.   Cardiovascular:      Rate and Rhythm: Normal rate.      Heart sounds: No murmur heard.     No gallop.   Pulmonary:      Effort: No respiratory distress.      Breath sounds: No wheezing or rales.   Skin:     Findings: Rash present.

## 2025-01-03 NOTE — ASSESSMENT & PLAN NOTE
Will taper off Lexapro, patient reported that he probably does not need it anymore.  Will taper off over the period of 6 weeks.    Orders:    escitalopram (LEXAPRO) 5 mg tablet; Take 1 tablet (5 mg total) by mouth daily

## 2025-01-13 ENCOUNTER — RESULTS FOLLOW-UP (OUTPATIENT)
Dept: FAMILY MEDICINE CLINIC | Facility: CLINIC | Age: 35
End: 2025-01-13

## 2025-04-03 DIAGNOSIS — E78.5 HYPERLIPIDEMIA, UNSPECIFIED HYPERLIPIDEMIA TYPE: ICD-10-CM

## 2025-04-04 RX ORDER — ROSUVASTATIN CALCIUM 5 MG/1
5 TABLET, COATED ORAL DAILY
Qty: 90 TABLET | Refills: 1 | Status: SHIPPED | OUTPATIENT
Start: 2025-04-04

## 2025-04-09 ENCOUNTER — OFFICE VISIT (OUTPATIENT)
Dept: DERMATOLOGY | Facility: CLINIC | Age: 35
End: 2025-04-09
Payer: COMMERCIAL

## 2025-04-09 DIAGNOSIS — D22.61 MULTIPLE BENIGN MELANOCYTIC NEVI OF UPPER AND LOWER EXTREMITIES AND TRUNK: ICD-10-CM

## 2025-04-09 DIAGNOSIS — D22.71 MULTIPLE BENIGN MELANOCYTIC NEVI OF UPPER AND LOWER EXTREMITIES AND TRUNK: ICD-10-CM

## 2025-04-09 DIAGNOSIS — D48.5 NEOPLASM OF UNCERTAIN BEHAVIOR OF SKIN: ICD-10-CM

## 2025-04-09 DIAGNOSIS — L57.8 OTHER SKIN CHANGES DUE TO CHRONIC EXPOSURE TO NONIONIZING RADIATION: ICD-10-CM

## 2025-04-09 DIAGNOSIS — L25.9 CONTACT DERMATITIS, UNSPECIFIED CONTACT DERMATITIS TYPE, UNSPECIFIED TRIGGER: Primary | ICD-10-CM

## 2025-04-09 DIAGNOSIS — D22.72 MULTIPLE BENIGN MELANOCYTIC NEVI OF UPPER AND LOWER EXTREMITIES AND TRUNK: ICD-10-CM

## 2025-04-09 DIAGNOSIS — D22.62 MULTIPLE BENIGN MELANOCYTIC NEVI OF UPPER AND LOWER EXTREMITIES AND TRUNK: ICD-10-CM

## 2025-04-09 DIAGNOSIS — L21.9 SEBORRHEIC DERMATITIS: ICD-10-CM

## 2025-04-09 DIAGNOSIS — L30.9 DERMATITIS: ICD-10-CM

## 2025-04-09 DIAGNOSIS — D22.5 MULTIPLE BENIGN MELANOCYTIC NEVI OF UPPER AND LOWER EXTREMITIES AND TRUNK: ICD-10-CM

## 2025-04-09 PROCEDURE — 99204 OFFICE O/P NEW MOD 45 MIN: CPT | Performed by: DERMATOLOGY

## 2025-04-09 PROCEDURE — 88305 TISSUE EXAM BY PATHOLOGIST: CPT | Performed by: PATHOLOGY

## 2025-04-09 PROCEDURE — 88342 IMHCHEM/IMCYTCHM 1ST ANTB: CPT | Performed by: PATHOLOGY

## 2025-04-09 PROCEDURE — 11102 TANGNTL BX SKIN SINGLE LES: CPT | Performed by: DERMATOLOGY

## 2025-04-09 PROCEDURE — 88341 IMHCHEM/IMCYTCHM EA ADD ANTB: CPT | Performed by: PATHOLOGY

## 2025-04-09 RX ORDER — CLOBETASOL PROPIONATE 0.5 MG/G
CREAM TOPICAL
Qty: 60 G | Refills: 3 | Status: SHIPPED | OUTPATIENT
Start: 2025-04-09

## 2025-04-09 RX ORDER — KETOCONAZOLE 20 MG/G
CREAM TOPICAL
Qty: 30 G | Refills: 10 | Status: SHIPPED | OUTPATIENT
Start: 2025-04-09

## 2025-04-09 NOTE — PROGRESS NOTES
"[facility]at[facility] Note     Patient Name: Jose Bowman  Encounter Date: 4/9/25     Have you been cared for by a [facility] Dermatologist in the last 3 years and, if so, which description applies to you?    NO.   I am considered a \"new\" patient and must complete all patient intake questions. I am MALE/not capable of bearing children.    REVIEW OF SYSTEMS:  Have you recently had or currently have any of the following? Recent fever or chills? No  Any non-healing wound? No   PAST MEDICAL HISTORY:  Have you personally ever had or currently have any of the following?  If \"YES,\" then please provide more detail. Skin cancer (such as Melanoma, Basal Cell Carcinoma, Squamous Cell Carcinoma?  No  Tuberculosis, HIV/AIDS, Hepatitis B or C: No  Radiation Treatment No   HISTORY OF IMMUNOSUPPRESSION:   Do you have a history of any of the following:  Systemic Immunosuppression such as Diabetes, Biologic or Immunotherapy, Chemotherapy, Organ Transplantation, Bone Marrow Transplantation or Prednisone?  No     Answering \"YES\" requires the addition of the dotphrase \"IMMUNOSUPPRESSED\" as the first diagnosis of the patient's visit.   FAMILY HISTORY:  Any \"first degree relatives\" (parent, brother, sister, or child) with the following?    Skin Cancer, Pancreatic or Other Cancer? YES, Mother had BCC   PATIENT EXPERIENCE:    Do you want the Dermatologist to perform a COMPLETE skin exam today including a clinical examination under the \"bra and underwear\" areas?  Yes  If necessary, do we have your permission to call and leave a detailed message on your Preferred Phone number that includes your specific medical information?  Yes      Allergies   Allergen Reactions    Bee Venom      Hives, resp distress      Current Outpatient Medications:     acetaminophen (TYLENOL) 500 mg tablet, Take 1,000 mg by mouth if needed for mild pain, Disp: , Rfl:     albuterol (PROVENTIL HFA,VENTOLIN HFA) 90 mcg/act inhaler, Inhale 2 puffs every 6 (six) " hours as needed for wheezing, Disp: 18 g, Rfl: 0    escitalopram (LEXAPRO) 5 mg tablet, Take 1 tablet (5 mg total) by mouth daily, Disp: 90 tablet, Rfl: 0    escitalopram (LEXAPRO) 5 mg tablet, Take 1 tablet (5 mg total) by mouth daily, Disp: 90 tablet, Rfl: 0    hydrOXYzine HCL (ATARAX) 25 mg tablet, TAKE 1 TABLET (25 MG TOTAL) BY MOUTH DAILY AT BEDTIME AS NEEDED FOR ANXIETY, Disp: 90 tablet, Rfl: 1    ketoconazole (NIZORAL) 2 % shampoo, Apply 1 Application topically 2 (two) times a week, Disp: 120 mL, Rfl: 3    loratadine (CLARITIN) 10 mg tablet, Take 10 mg by mouth if needed, Disp: , Rfl:     rosuvastatin (CRESTOR) 5 mg tablet, TAKE ONE TABLET BY MOUTH EVERY DAY, Disp: 90 tablet, Rfl: 1          Whom besides the patient is providing clinical information about today's encounter?   NO ADDITIONAL HISTORIAN (patient alone provided history)    Physical Exam and Assessment/Plan by Diagnosis:    CHERRY ANGIOMAS     Physical Exam:  Anatomic Location Affected:  Trunk and extremities  Morphological Description:  Scattered cherry red papules  Denies pain, itch, bleeding. No treatments tried. Present for years. Present constantly; no modifying factors which make it worse or better.     Assessment and Plan:  Based on a thorough discussion of this condition and the management approach to it (including a comprehensive discussion of the known risks, side effects and potential benefits of treatment), the patient (family) agrees to implement the following specific plan:  Reassure benign                SOLAR LENTIGINES   OTHER SKIN CHANGES DUE TO CHRONIC EXPOSURE TO NONIONIZING RADIATION     Physical Exam:  Anatomic Location Affected:  Sun exposed areas of back, chest, arms, legs  Morphological Description:  Multiple scattered brown to tan evenly pigmented macules   Denies pain, itch, bleeding. No treatments tried. Present for months - years. Reports getting newer lesions with sun exposure.         Assessment and Plan:  Based on a  "thorough discussion of this condition and the management approach to it (including a comprehensive discussion of the known risks, side effects and potential benefits of treatment), the patient (family) agrees to implement the following specific plan:  Reassure benign  Use sun protection.  Apply SPF 30 or higher at least three times a day.  Wear sun protecting clothing and hats.         MULTIPLE MELANOCYTIC NEVI (\"Moles\")     Physical Exam:  Anatomic Location Affected: Trunk and extremities  Morphological Description:  Scattered, round to ovoid, symmetrical-appearing, even bordered, skin colored to dark brown macules/papules  Denies pain, itch, bleeding. No treatments tried. Present for years. Present constantly; no modifying factors which make it worse or better. Denies actively changing or growing moles.      Assessment and Plan:  Based on a thorough discussion of this condition and the management approach to it (including a comprehensive discussion of the known risks, side effects and potential benefits of treatment), the patient (family) agrees to implement the following specific plan:  Reassure benign  Monitor for changes  Use sun protection.  Apply SPF 30 or higher at least three times a day.  Wear sun protecting clothing and hats.       Worrisome signs of skin malignancy discussed, questions answered. Regular self-skin check discussed. Advised to call or return to office if patient notices any spots of concern, rapidly growing/changing lesions, bleeding lesions, non-healing lesions. Advised regular SPF use.     DERMATITIS   Physical Exam:  Anatomic Location Affected:  Back, buttocks  Morphological Description:  erythematous itchy plaques     Additional History of Present Condition:  Present on exam. Patient reports having a rash that starts out itchy and then turns to a bruise.     Assessment and Plan:  Based on a thorough discussion of this condition and the management approach to it (including a " "comprehensive discussion of the known risks, side effects and potential benefits of treatment), the patient (family) agrees to implement the following specific plan:  Start using Clobetasol cream AS NEEDED. Do not use more than 2 weeks straight    SEBORRHEIC DERMATITIS     Physical Exam:  Anatomic Location Affected &  Morphological Description:  Greasy adherent scale/scaling plaques on the Chest, back, scalp and beard    Additional History of Present Condition:    Duration: years  Attempted treatments:Ketoconazole shampoo    Assessment and Plan:  History and physical exam most consistent with seborrheic dermatitis. The chronic nature of this condition and association with normal skin yeast were reviewed. Educated that the goal of therapy is to control symptoms, but this is not typically something we cure and intermittent flares can be expected. Based on a thorough discussion of this condition and the management approach to it (including a comprehensive discussion of the known risks, side effects and potential benefits of treatment), the patient (family) agrees to implement the following specific plan:           SCALP  - Continue ketoconazole 2% shampoo 3x/week to damp scalp, let sit 5-10 minutes then rinse (may use normal shampoo/conditioner thereafter as desired)    CHEST/BACK/FACE  -Start using ketoconazole cream daily as a maintenance.     NEOPLASM OF UNCERTAIN BEHAVIOR OF SKIN    Physical Exam:  (Anatomic Location); (Size and Morphological Description); (Differential Diagnosis):  Specimen A: skin, shave biopsy. Right upper back; unevenly pigmented brown macule. Ddx: atypical nevus versus Melanoma    Assessment and Plan:  I have discussed with the patient that a sample of skin via a \"skin biopsy” would be potentially helpful to further make a specific diagnosis under the microscope.  Based on a thorough discussion of this condition and the management approach to it (including a comprehensive discussion of the " known risks, side effects and potential benefits of treatment), the patient (family) agrees to implement the following specific plan:    Procedure:  Skin Biopsy.  After a thorough discussion of treatment options and risk/benefits/alternatives (including but not limited to local pain, scarring, dyspigmentation, blistering, possible superinfection, and inability to confirm a diagnosis via histopathology), verbal and written consent were obtained and portion of the rash was biopsied for tissue sample.  See below for consent that was obtained from patient and subsequent Procedure Note.    PROCEDURE TANGENTIAL (SHAVE) BIOPSY NOTE:    Performing Physician:   Anatomic Location; Clinical Description with size (cm); Pre-Op Diagnosis:   Specimen A: skin, shave biopsy. Right upper back; unevenly pigmented brown macule. Ddx: atypical nevus versus Melanoma  Post-op diagnosis: Same     Local anesthesia: 1% xylocaine with epi      Topical anesthesia: None    Hemostasis: Electrocautery       After obtaining informed consent  at which time there was a discussion about the purpose of biopsy  and low risks of infection and bleeding.  The area was prepped and draped in the usual fashion. Anesthesia was obtained with 1% lidocaine with epinephrine. A shave biopsy to an appropriate sampling depth was obtained by Shave (Dermablade or 15 blade) The resulting wound was covered with surgical ointment and bandaged appropriately.     The patient tolerated the procedure well without complications and was without signs of functional compromise.      Specimen has been sent for review by Dermatopathology.    Standard post-procedure care has been explained and has been included in written form within the patient's copy of Informed Consent.    INFORMED CONSENT DISCUSSION AND POST-OPERATIVE INSTRUCTIONS FOR PATIENT    I.  RATIONALE FOR PROCEDURE  I understand that a skin biopsy allows the Dermatologist to test a lesion or rash under the  "microscope to obtain a diagnosis.  It usually involves numbing the area with numbing medication and removing a small piece of skin; sometimes the area will be closed with sutures. In this specific procedure, sutures are not usually needed.  If any sutures are placed, then they are usually need to be removed in 2 weeks or less.    I understand that my Dermatologist recommends that a skin \"shave\" biopsy be performed today.  A local anesthetic, similar to the kind that a dentist uses when filling a cavity, will be injected with a very small needle into the skin area to be sampled.  The injected skin and tissue underneath \"will go to sleep” and become numb so no pain should be felt afterwards.  An instrument shaped like a tiny \"razor blade\" (shave biopsy instrument) will be used to cut a small piece of tissue and skin from the area so that a sample of tissue can be taken and examined more closely under the microscope.  A slight amount of bleeding will occur, but it will be stopped with direct pressure and a pressure bandage and any other appropriate methods.  I understands that a scar will form where the wound was created.  Surgical ointment will be applied to help protect the wound.  Sutures are not usually needed.    II.  RISKS AND POTENTIAL COMPLICATIONS   I understand the risks and potential complications of a skin biopsy include but are not limited to the following:  Bleeding  Infection  Pain  Scar/keloid  Skin discoloration  Incomplete Removal  Recurrence  Nerve Damage/Numbness/Loss of Function  Allergic Reaction to Anesthesia  Biopsies are diagnostic procedures and based on findings additional treatment or evaluation may be required  Loss or destruction of specimen resulting in no additional findings    My Dermatologist has explained to me the nature of the condition, the nature of the procedure, and the benefits to be reasonably expected compared with alternative approaches.  My Dermatologist has discussed the " "likelihood of major risks or complications of this procedure including the specific risks listed above, such as bleeding, infection, and scarring/keloid.  I understand that a scar is expected after this procedure.  I understand that my physician cannot predict if the scar will form a \"keloid,\" which extends beyond the borders of the wound that is created.  A keloid is a thick, painful, and bumpy scar.  A keloid can be difficult to treat, as it does not always respond well to therapy, which includes injecting cortisone directly into the keloid every few weeks.  While this usually reduces the pain and size of the scar, it does not eliminate it.      I understand that photographs may be taken before and after the procedure.  These will be maintained as part of the medical providers confidential records and may not be made available to me.  I further authorize the medical provider to use the photographs for teaching purposes or to illustrate scientific papers, books, or lectures if in his/her judgment, medical research, education, or science may benefit from its use.    I have had an opportunity to fully inquire about the risks and benefits of this procedure and its alternatives.   I have been given ample time and opportunity to ask questions and to seek a second opinion if I wished to do so.  I acknowledge that there have specifically been no guarantees as to the cosmetic results from the procedure.  I am aware that with any procedure there is always the possibility of an unexpected complication.    III. POST-PROCEDURAL CARE (WHAT YOU WILL NEED TO DO \"AFTER THE BIOPSY\" TO OPTIMIZE HEALING)    Keep the area clean and dry.  Try NOT to remove the bandage or get it wet for the first 24 hours.    Gently clean the area and apply surgical ointment (such as Vaseline petrolatum ointment, which is available \"over the counter\" and not a prescription) to the biopsy site for up to 2 weeks straight.  This acts to protect the wound " from the outside world.      Sutures are not usually placed in this procedure.  If any sutures were placed, return for suture removal as instructed (generally 1 week for the face, 2 weeks for the body).      Take Acetaminophen (Tylenol) for discomfort, if no contraindications.  Ibuprofen or aspirin could make bleeding worse.    Call our office immediately for signs of infection: fever, chills, increased redness, warmth, tenderness, discomfort/pain, or pus or foul smell coming from the wound.    WHAT TO DO IF THERE IS ANY BLEEDING?  If a small amount of bleeding is noticed, place a clean cloth over the area and apply firm pressure for ten minutes.  Check the wound after 10 minutes of direct pressure.  If bleeding persists, try one more time for an additional 10 minutes of direct pressure on the area.  If the bleeding becomes heavier or does not stop after the second attempt, or if you have any other questions about this procedure, then please call your [facility]'s Dermatologist by calling [phone] (SKIN).     I hereby acknowledge that I have reviewed and verified the site with my Dermatologist and have requested and authorized my Dermatologist to proceed with the procedure.          Scribe Attestation      I,:  Marcel Alvarado am acting as a scribe while in the presence of the attending physician.:       I,:  Kristi Juan MD personally performed the services described in this documentation    as scribed in my presence.:

## 2025-04-14 PROCEDURE — 88342 IMHCHEM/IMCYTCHM 1ST ANTB: CPT | Performed by: PATHOLOGY

## 2025-04-14 PROCEDURE — 88305 TISSUE EXAM BY PATHOLOGIST: CPT | Performed by: PATHOLOGY

## 2025-04-14 PROCEDURE — 88341 IMHCHEM/IMCYTCHM EA ADD ANTB: CPT | Performed by: PATHOLOGY

## 2025-04-15 ENCOUNTER — RESULTS FOLLOW-UP (OUTPATIENT)
Dept: DERMATOLOGY | Facility: CLINIC | Age: 35
End: 2025-04-15

## 2025-04-16 ENCOUNTER — TELEPHONE (OUTPATIENT)
Dept: DERMATOLOGY | Facility: CLINIC | Age: 35
End: 2025-04-16

## 2025-04-16 NOTE — TELEPHONE ENCOUNTER
Called patient to schedule excision for right upper backat our [place] office with dr.Ansah- Calloway. Offered him the date of May 13th and let him know the various times we have available. Provided patient with Call back number to be schduled.

## 2025-05-13 ENCOUNTER — TELEPHONE (OUTPATIENT)
Age: 35
End: 2025-05-13

## 2025-05-13 ENCOUNTER — PROCEDURE VISIT (OUTPATIENT)
Age: 35
End: 2025-05-13
Payer: COMMERCIAL

## 2025-05-13 VITALS
BODY MASS INDEX: 26.31 KG/M2 | TEMPERATURE: 97.5 F | DIASTOLIC BLOOD PRESSURE: 76 MMHG | WEIGHT: 194 LBS | SYSTOLIC BLOOD PRESSURE: 115 MMHG

## 2025-05-13 DIAGNOSIS — D22.9 ATYPICAL NEVUS: Primary | ICD-10-CM

## 2025-05-13 PROCEDURE — 11402 EXC TR-EXT B9+MARG 1.1-2 CM: CPT | Performed by: REGISTERED NURSE

## 2025-05-13 PROCEDURE — 88305 TISSUE EXAM BY PATHOLOGIST: CPT | Performed by: PATHOLOGY

## 2025-05-13 PROCEDURE — 12032 INTMD RPR S/A/T/EXT 2.6-7.5: CPT | Performed by: REGISTERED NURSE

## 2025-05-13 NOTE — PATIENT INSTRUCTIONS
"  YOUR \"AFTER SURGERY\" REVIEW & INSTRUCTIONS    What to Know About Your Procedure  An \"excision\" was performed today to allow the dermatologist to remove a skin lesion. The procedure involves a local numbing medication and removing the entire lesion (or as much as possible). Typically, the lesion is being removed because it does not look \"normal,\" because it is becoming irritated and traumatized, or for significant appearance reasons.  The skin was cut deeply and then repaired - usually with sutures (stitches).  The removed tissue has been sent to the pathologist who will confirm the diagnosis and verify if the lesion has been completely removed.  Surgical “Vaseline-type” ointment has been applied after the procedure to help create a barrier between your wound and the outside world.     The advantage of using sutures (stitches) to repair a skin excision is that it allows the lesion to heal as quickly as possible with the least amount of scarring and risk of infection, Still, there are some risks and potential complications that you should watch out for that include but are not limited to the following:    Some bleeding is normal at the time of procedure and some bleeding on the gauze bandage after the procedure is normal for the first few days after surgery.  Profuse bleeding or bleeding with swelling and pain is NOT normal and should be reported as detailed below.  Infection is uncommon after skin surgery.  Infection should be reported and is indicated by pain, redness, and discharge of purulent material.  Some pain may occur initially the day after surgery.  Persistent pain or increasing pain days after surgery is not expected and should be reported.  Every effort is made to minimize scar, but location, size, and genetics do play a role in scar appearance.  A surgical wound does not achieve its optimal appearance until 6 months.  There are several treatments available if scarring would be problematic including " "scar creams, silicone pad, laser and scar revision.  Skin discoloration can occur especially in people of color.  Its important to avoid sun on wound in first 6 months after surgery.  Treatment is available if pigment is problematic.  Incomplete removal of the lesion or recurrence of lesion can occur and - depending on the lesion - this would then require further treatment and more surgery.  Nerve damage/numbness and/or loss of function is very rare, but is most likely to occur if the lesion being removed is large or if it is in a \"high risk\" location.  Allergic reaction to lidocaine is rare.  More commonly, epinephrine is used with the lidocaine, and, occasionally, epinephrine (a.k.a., adrenalin) may cause a brief feeling of anxiety or jitteriness.  The person at the microscope (pathologist) may provide additional information that was unexpected. This unexpected finding could prompt the need for additional treatment or evaluation.    At-Home Wound Care  Try NOT to remove the pressure bandage for 48 hours. Keep the area clean and dry while this bandage is on.   After removing the bandage for the first time, gently clean the area with soap and water. If the bandage is difficult to remove, getting the bandage wet in the shower will sometimes help soften the adhesive and allow it to be removed more easily.   You will now need to cleanse this area daily in the shower with gentle soap. There is no need to scrub the area. You will need to apply plain Vaseline ointment (this is over the counter and not a prescription) to the site for up to 2 weeks followed by a clean appropriately sized bandage to area.  Non stick dressing and paper tape (or Hypafix) are recommended for sensitive skin but a bandaid is fine if it covers the area well.  In general, sutures (stitches) are removed in about 5-7 days for face wounds and in about 12-14 days for the body wounds.  Your dermatologist wants you to return for suture removal in 10-14 " "DAYS.     General Restrictions  For TWO (2) DAYS:  You will need to take it very easy as this time is highest risk for bleeding. Being a \"couch potato\" during these two days is generally recommended.   For surgeries on the face/neck/scalp: Avoid leaning down to pick things up off the floor as this brings blood up to your head. Instead, squat down to pick things up.     For TWO WEEKS (14 DAYS):   No heavy lifting (anything greater than 10 pounds)   You can start to do slow, gentle activities such as slow walking but nothing to increase your heart rate and blood pressure too much (such as cardiovascular exercise).  It is important to take it easy as there is still a risk for bleeding and a high risk popping of stitches open during this time.     Site Specific Restrictions  If we did surgery near your eyes (including the nose, forehead, front part of your scalp, cheeks): It is VERY common to get a large amount of swelling around your eyes (puffy eyes). Although less frequent, this can be enough to swell your eyes shut and can also come along with bruising. This should not hurt and is very expected and normal. It is typically worst at ~ 3 days out from your surgery and dramatically better 1 week post-operatively.   If we did surgery around your nose: No blowing your nose as this puts you at higher risk of popping stitches durign this time. Instead dab under your nose with a tissue or use a Q-tip inside your nose.  If we did surgery on the skin above or below your lip or your lip itself: No sipping from straws as this uses a lot of the muscles around your mouth and increases the risk of popping stitches during this time.    Managing Your Pain After Surgery  You can expect to have some pain after surgery. This is normal. The pain is typically worse the first two days after surgery, and quickly begins to get better.   You can use heating pads or ice packs on your incisions to help reduce your pain.   The best strategy for " "controlling your pain after surgery is \"around the clock\" pain control. You can take \"over-the-counter\" (non-prescription) Acetaminophen (Tylenol) for discomfort, unless you have been told not to by your physician.  If you are taking Tylenol at the maximum dose, you can alternate Tylenol with Advil/Motrin (ibuprofen) as long as there are no contraindications.  Alternating these medications with each other (I.e., Tylenol followed by Motrin/Advil) allows you to maximize your pain control.  To alternate these medications properly, you will take a dose of pain medication every three hours, alternating Tylenol (acetaminophen) and Advil/Motrin (ibuprofen).  Start by taking 650 mg of Tylenol (2 pills of 325 mg)  3 hours later take 600 mg of Motrin (3 pills of 200 mg)  3 hours after taking the Motrin take 650 mg of Tylenol  3 hours after that take 600 mg of Motrin.    As an example, if your first dose of Tylenol (acetaminophen) is at 12:00 PM, then you would alternate with Motrin as directed below, continuing usually for no more than a total of 48 hours straight:     12:00 PM  Tylenol 650 mg (2 pills of 325 mg)    3:00 PM  Motrin 600 mg (3 pills of 200 mg)    6:00 PM  Tylenol 650 mg (2 pills of 325 mg)    9:00 PM  Motrin 600 mg (3 pills of 200 mg)      WARNING:  Do NOT take more than 4000 mg of Tylenol or 3200 mg of Motrin in a \"24-hour\" period.       What if you still have pain?    If you have pain that is not controlled with the over-the-counter pain medications (Tylenol and Motrin/Advil), do not hesitate to call our staff using the number provided. We will help make sure you are managing your pain in the best way possible, and if necessary, we can provide a prescription for additional pain medication.     Call our office IMMEDIATELY with any signs of wound infection.  This includes fever, chills, increasing redness, warmth, tenderness, severe discomfort/pain, or pus or foul smell coming from the wound. [facility] can be directly at [phone] (SKIN) and ask for the on-call Dermatologist covering surgery/Mohs.    If Bleeding is Noticed  If bleeding is soaking through the bandage, remove the bandage and see where the bleeding is coming from.  Place a clean cloth over the area and apply firm pressure directly to the area that is bleeding for thirty minutes.    Check the wound ONLY after 30 minutes of direct pressure; do not cheat and sneak a peak, as that does not count (i.e., resets the clock back to zero).  If bleeding persists after 30 minutes of legitimate direct pressure, then try one more round of direct pressure to the area.    Should bleeding become heavier or not stop after the second application of direct pressure for 30 minutes, then call [facility] directly at [phone] (SKIN) and ask to speak with the on-call Dermatologist covering surgery/Mohs.  If after hours, go to your nearest Emergency Room or Urgent Care and have the team call [facility] directly at [phone] (SKIN); you will be connected to our after hours team.

## 2025-05-13 NOTE — PROGRESS NOTES
"[facility] Note     Patient Name: Jose Bowman  Encounter Date: 05/13/2025       Have you been cared for by a [facility] Dermatologist in the last 3 years and, if so, which description applies to you? Yes. I have been here within the last 3 years, and my medical history has NOT changed since that time. I am not of child-bearing potential.     REVIEW OF SYSTEMS:  Have you recently had or currently have any of the following? No changes in my recent health.   PAST MEDICAL HISTORY:  Have you personally ever had or currently have any of the following?  If \"YES,\" then please provide more detail. No changes in my medical history.   HISTORY OF IMMUNOSUPPRESSION: Do you have a history of any of the following:  Systemic Immunosuppression such as Diabetes, Biologic or Immunotherapy, Chemotherapy, Organ Transplantation, Bone Marrow Transplantation or Prednisone?  No     Answering \"YES\" requires the addition of the dotphrase \"IMMUNOSUPPRESSED\" as the first diagnosis of the patient's visit.   FAMILY HISTORY:  Any \"first degree relatives\" (parent, brother, sister, or child) with the following?    No changes in my family's known health.   PATIENT EXPERIENCE:    Do you want the Dermatologist to perform a COMPLETE skin exam today including a clinical examination under the \"bra and underwear\" areas?  NO  If necessary, do we have your permission to call and leave a detailed message on your Preferred Phone number that includes your specific medical information?  Yes      Allergies   Allergen Reactions    Bee Venom      Hives, resp distress      Current Outpatient Medications:     acetaminophen (TYLENOL) 500 mg tablet, Take 1,000 mg by mouth if needed for mild pain, Disp: , Rfl:     albuterol (PROVENTIL HFA,VENTOLIN HFA) 90 mcg/act inhaler, Inhale 2 puffs every 6 (six) hours as needed for wheezing, Disp: 18 g, Rfl: 0    clobetasol (TEMOVATE) 0.05 % cream, 2 times a day as needed for rash, Disp: 60 g, Rfl: 3    " "escitalopram (LEXAPRO) 5 mg tablet, Take 1 tablet (5 mg total) by mouth daily (Patient not taking: Reported on 4/9/2025), Disp: 90 tablet, Rfl: 0    escitalopram (LEXAPRO) 5 mg tablet, Take 1 tablet (5 mg total) by mouth daily (Patient not taking: Reported on 4/9/2025), Disp: 90 tablet, Rfl: 0    hydrOXYzine HCL (ATARAX) 25 mg tablet, TAKE 1 TABLET (25 MG TOTAL) BY MOUTH DAILY AT BEDTIME AS NEEDED FOR ANXIETY, Disp: 90 tablet, Rfl: 1    ketoconazole (NIZORAL) 2 % cream, Apply on face. Ok to use daily, Disp: 30 g, Rfl: 10    ketoconazole (NIZORAL) 2 % shampoo, Apply 1 Application topically 2 (two) times a week, Disp: 120 mL, Rfl: 3    loratadine (CLARITIN) 10 mg tablet, Take 10 mg by mouth if needed, Disp: , Rfl:     rosuvastatin (CRESTOR) 5 mg tablet, TAKE ONE TABLET BY MOUTH EVERY DAY, Disp: 90 tablet, Rfl: 1          Whom besides the patient is providing clinical information about today's encounter?   NO ADDITIONAL HISTORIAN (patient alone provided history)    Physical Exam and Assessment/Plan by Diagnosis:      PROCEDURE:  EXCISION NOTE     Procedural Plan:     Attending:  Dr. Greene  Assistant:  PADMINI Dhillon  Lesion Anatomic Location (use description from previous biopsy if applicable): right upper back   Pre-Op Diagnosis: Compound melanocytic nevus with moderate to severe atypia  Lesion is being treated as \"benign\" or \"MALIGNANT\": benign  Accession Number of any associated previous biopsy/excision: [id number]     Written and verbal (witnessed) informed consent was obtained. We discussed that \"excision\" is a method of removing lesions, both benign and malignant lesions.  A portion of normal skin is often taken to ensure completeness of removal.  I reviewed that this procedure will include numbing the area, cutting around and under the skin lesion, undermining (\"freeing up\") surrounding tissue, and closing the wound with sutures (stitches) both inside and out.  Risks include and are not limited to the " "following:  Bleeding, pain, infection, scarring, recurrence, more required treatment, no additional information, numbness and/or loss of function (if nerves are damaged).  These risks were considered against the benefits that we discussed, and the patient opted to continue with the procedure. It was discussed with patient that every effort is made to minimize scarring, but scarring is influenced also by extrinsic factor such as location, age and genetics.     Procedural Time Out:      Correct patient? yes  Correct site per Clinic Report? yes  Correct site per previous Path Report? yes  Correct site per Patient's recollection? yes    Anesthesia:      Local anesthesia: 1% xylocaine with epi     Excision Description:      Post-Op Diagnosis: Same as Pre-Op Diagnosis (above)  Pre-op Size: 0.7 cm  Margins (enter \"0\" for lipoma/cyst or similar diagnosis): 0.2 cm  TOTAL POSTOPERATIVE DEFECT SIZE (I.e., Pre-op Size + Margins on both sides): 1.1 cm    The patient was seated in the procedure/exam room, anesthetized locally, prepped and draped in the usual fashion. Using a #15 blade with a scalpel, the lesion was excised in elliptical fashion.     REQUIRED Mirlande MELANOMA DATA      This procedure was not performed to treat primary cutaneous melanoma through wide local excision      Closure Description:      The specific type of closure that was utilized:     INTERMEDIATE Closure  INTERMEDIATE CLOSURE     The patient was brought back into the procedure room, anesthetized locally, prepped and draped in the usual fashion. Using a #15 blade with a scalpel, the lesion was excised in elliptical fashion. The wound was  undermined in the fascial plane.  Purpose of undermining was to decrease wound tension and facilitate closure. Hemostasis was achieved with light electrocoagulation.    The wound was closed with subcutaneous sutures as follows:    Deep Suture Throw, Size, and Type (select all that apply):  Interrupted Deeps; 3-0; vicryl " "    Epidermal edge closure was accomplished with superficial sutures as follows:    Superficial Suture Throw, Size and Type (select all that apply):  Simple Interrupted; 4-0; Prolene    FINAL LENGTH OF CLOSURE (please enter a length even for lipoma/cyst or similar diagnosis): 4.5 cm       Postoperative Care:      The wound was cleaned with sterile saline, dried off, surgical vaseline ointment was applied, and the wound was covered. A pressure dressing was applied for stabilization and light pressure.    Estimated blood loss:  Less than 3ml.  Complications: none  Post-op medications: none  Additional notes: none    Discharge Plans:      Discharge plans: Plan for return to us for suture removal, as scheduled in 10-14 days.   Patient condition at discharge: STABLE    The patient was given detailed oral and written instructions on postoperative care as detailed in consent. We urged the patient to call us if any problems or question should arise.         Please complete this section and then \"cut and paste\" it into the Patient Instructions section.  These notes should be printed and shared directly with the patient for review PRIOR TO leaving our office.         YOUR \"AFTER SURGERY\" REVIEW & INSTRUCTIONS    What to Know About Your Procedure  An \"excision\" was performed today to allow the dermatologist to remove a skin lesion. The procedure involves a local numbing medication and removing the entire lesion (or as much as possible). Typically, the lesion is being removed because it does not look \"normal,\" because it is becoming irritated and traumatized, or for significant appearance reasons.  The skin was cut deeply and then repaired - usually with sutures (stitches).  The removed tissue has been sent to the pathologist who will confirm the diagnosis and verify if the lesion has been completely removed.  Surgical “Vaseline-type” ointment has been applied after the procedure to help create a barrier between your wound and " "the outside world.     The advantage of using sutures (stitches) to repair a skin excision is that it allows the lesion to heal as quickly as possible with the least amount of scarring and risk of infection, Still, there are some risks and potential complications that you should watch out for that include but are not limited to the following:    Some bleeding is normal at the time of procedure and some bleeding on the gauze bandage after the procedure is normal for the first few days after surgery.  Profuse bleeding or bleeding with swelling and pain is NOT normal and should be reported as detailed below.  Infection is uncommon after skin surgery.  Infection should be reported and is indicated by pain, redness, and discharge of purulent material.  Some pain may occur initially the day after surgery.  Persistent pain or increasing pain days after surgery is not expected and should be reported.  Every effort is made to minimize scar, but location, size, and genetics do play a role in scar appearance.  A surgical wound does not achieve its optimal appearance until 6 months.  There are several treatments available if scarring would be problematic including scar creams, silicone pad, laser and scar revision.  Skin discoloration can occur especially in people of color.  Its important to avoid sun on wound in first 6 months after surgery.  Treatment is available if pigment is problematic.  Incomplete removal of the lesion or recurrence of lesion can occur and - depending on the lesion - this would then require further treatment and more surgery.  Nerve damage/numbness and/or loss of function is very rare, but is most likely to occur if the lesion being removed is large or if it is in a \"high risk\" location.  Allergic reaction to lidocaine is rare.  More commonly, epinephrine is used with the lidocaine, and, occasionally, epinephrine (a.k.a., adrenalin) may cause a brief feeling of anxiety or jitteriness.  The person at the " "microscope (pathologist) may provide additional information that was unexpected. This unexpected finding could prompt the need for additional treatment or evaluation.    At-Home Wound Care  Try NOT to remove the pressure bandage for 48 hours. Keep the area clean and dry while this bandage is on.   After removing the bandage for the first time, gently clean the area with soap and water. If the bandage is difficult to remove, getting the bandage wet in the shower will sometimes help soften the adhesive and allow it to be removed more easily.   You will now need to cleanse this area daily in the shower with gentle soap. There is no need to scrub the area. You will need to apply plain Vaseline ointment (this is over the counter and not a prescription) to the site for up to 2 weeks followed by a clean appropriately sized bandage to area.  Non stick dressing and paper tape (or Hypafix) are recommended for sensitive skin but a bandaid is fine if it covers the area well.  In general, sutures (stitches) are removed in about 5-7 days for face wounds and in about 12-14 days for the body wounds.  Your dermatologist wants you to return for suture removal in 10-14 DAYS.     General Restrictions  For TWO (2) DAYS:  You will need to take it very easy as this time is highest risk for bleeding. Being a \"couch potato\" during these two days is generally recommended.   For surgeries on the face/neck/scalp: Avoid leaning down to pick things up off the floor as this brings blood up to your head. Instead, squat down to pick things up.     For TWO WEEKS (14 DAYS):   No heavy lifting (anything greater than 10 pounds)   You can start to do slow, gentle activities such as slow walking but nothing to increase your heart rate and blood pressure too much (such as cardiovascular exercise).  It is important to take it easy as there is still a risk for bleeding and a high risk popping of stitches open during this time.     Site Specific " "Restrictions  If we did surgery near your eyes (including the nose, forehead, front part of your scalp, cheeks): It is VERY common to get a large amount of swelling around your eyes (puffy eyes). Although less frequent, this can be enough to swell your eyes shut and can also come along with bruising. This should not hurt and is very expected and normal. It is typically worst at ~ 3 days out from your surgery and dramatically better 1 week post-operatively.   If we did surgery around your nose: No blowing your nose as this puts you at higher risk of popping stitches durign this time. Instead dab under your nose with a tissue or use a Q-tip inside your nose.  If we did surgery on the skin above or below your lip or your lip itself: No sipping from straws as this uses a lot of the muscles around your mouth and increases the risk of popping stitches during this time.    Managing Your Pain After Surgery  You can expect to have some pain after surgery. This is normal. The pain is typically worse the first two days after surgery, and quickly begins to get better.   You can use heating pads or ice packs on your incisions to help reduce your pain.   The best strategy for controlling your pain after surgery is \"around the clock\" pain control. You can take \"over-the-counter\" (non-prescription) Acetaminophen (Tylenol) for discomfort, unless you have been told not to by your physician.  If you are taking Tylenol at the maximum dose, you can alternate Tylenol with Advil/Motrin (ibuprofen) as long as there are no contraindications.  Alternating these medications with each other (I.e., Tylenol followed by Motrin/Advil) allows you to maximize your pain control.  To alternate these medications properly, you will take a dose of pain medication every three hours, alternating Tylenol (acetaminophen) and Advil/Motrin (ibuprofen).  Start by taking 650 mg of Tylenol (2 pills of 325 mg)  3 hours later take 600 mg of Motrin (3 pills of 200 " "mg)  3 hours after taking the Motrin take 650 mg of Tylenol  3 hours after that take 600 mg of Motrin.    As an example, if your first dose of Tylenol (acetaminophen) is at 12:00 PM, then you would alternate with Motrin as directed below, continuing usually for no more than a total of 48 hours straight:     12:00 PM  Tylenol 650 mg (2 pills of 325 mg)    3:00 PM  Motrin 600 mg (3 pills of 200 mg)    6:00 PM  Tylenol 650 mg (2 pills of 325 mg)    9:00 PM  Motrin 600 mg (3 pills of 200 mg)      WARNING:  Do NOT take more than 4000 mg of Tylenol or 3200 mg of Motrin in a \"24-hour\" period.       What if you still have pain?    If you have pain that is not controlled with the over-the-counter pain medications (Tylenol and Motrin/Advil), do not hesitate to call our staff using the number provided. We will help make sure you are managing your pain in the best way possible, and if necessary, we can provide a prescription for additional pain medication.     Call our office IMMEDIATELY with any signs of wound infection.  This includes fever, chills, increasing redness, warmth, tenderness, severe discomfort/pain, or pus or foul smell coming from the wound. [facility] can be directly at [phone] (SKIN) and ask for the on-call Dermatologist covering surgery/Mohs.    If Bleeding is Noticed  If bleeding is soaking through the bandage, remove the bandage and see where the bleeding is coming from.  Place a clean cloth over the area and apply firm pressure directly to the area that is bleeding for thirty minutes.    Check the wound ONLY after 30 minutes of direct pressure; do not cheat and sneak a peak, as that does not count (i.e., resets the clock back to zero).  If bleeding persists after 30 minutes of legitimate direct pressure, then try one more round of direct pressure to the area.    Should bleeding become heavier or not stop after the second application of direct pressure for 30 minutes, then call [facility] " Derm[facility] directly at [phone] (SKIN) and ask to speak with the on-call Dermatologist covering surgery/Mohs.  If after hours, go to your nearest Emergency Room or Urgent Care and have the team call [facility] directly at [phone] (SKIN); you will be connected to our after hours team.             Scribe Attestation      I,:  Fabienne Spain MA am acting as a scribe while in the presence of the attending physician.:       I,:  Ar Greene MD personally performed the services described in this documentation    as scribed in my presence.:

## 2025-05-13 NOTE — TELEPHONE ENCOUNTER
Received a call from patient after his excision today asking about the heavy lifting limits as he wasn't sure to how much is heavy is.  Advised:   For TWO WEEKS (14 DAYS):   No heavy lifting (anything greater than 10 pounds)

## 2025-05-16 PROCEDURE — 88305 TISSUE EXAM BY PATHOLOGIST: CPT | Performed by: PATHOLOGY

## 2025-05-27 ENCOUNTER — CLINICAL SUPPORT (OUTPATIENT)
Dept: DERMATOLOGY | Facility: CLINIC | Age: 35
End: 2025-05-27

## 2025-05-27 DIAGNOSIS — Z48.02 ENCOUNTER FOR REMOVAL OF SUTURES: Primary | ICD-10-CM

## 2025-05-27 PROCEDURE — RECHECK

## 2025-05-27 NOTE — PROGRESS NOTES
"Suture removal    Date/Time: 5/27/2025 8:30 AM    Performed by: Jeana Sapp RN  Authorized by: Mary Carmen Ha MD    Universal Protocol:  procedure performed by consultantConsent: Verbal consent obtained. Written consent not obtained  Risks and benefits: risks, benefits and alternatives were discussed  Consent given by: patient  Time out: Immediately prior to procedure a \"time out\" was called to verify the correct patient, procedure, equipment, support staff and site/side marked as required.  Timeout called at: 5/27/2025 8:24 AM.  Patient understanding: patient states understanding of the procedure being performed  Patient consent: the patient's understanding of the procedure matches consent given  Procedure consent: procedure consent matches procedure scheduled  Relevant documents: relevant documents not present or verified  Test results: test results not available  Site marked: the operative site was not marked  Radiology Images displayed and confirmed. If images not available, report reviewed: imaging studies not available  Patient identity confirmed: verbally with patient      Patient location:  Clinic  Location:     Laterality:  Right    Location:  Trunk    Trunk location:  Back (upper)  Procedure details:     Tools used:  Suture removal kit    Wound appearance:  No sign(s) of infection, good wound healing, clean, moist and pink  Post-procedure details:     Post-removal:  Band-Aid applied (Vaseline applied)    Patient tolerance of procedure:  Tolerated well, no immediate complications      Before suture removal     After suture removal   "

## 2025-06-06 ENCOUNTER — TELEPHONE (OUTPATIENT)
Age: 35
End: 2025-06-06

## 2025-06-06 NOTE — TELEPHONE ENCOUNTER
LVM for pt regarding appt for 10/08 with Dr Greene in our [place] office. Informed pt that there has been a schedule change and we will need to r/s this appt for the next available appt spot we have. Informed pt that I will put them in the for the first available since spots fill quickly. This new appt will be 10/01 at 750 am with Dr Greene in our [place] office. Advised pt to callback to r/s or cancel this appt if this does not work.

## 2025-08-20 DIAGNOSIS — F41.8 ANXIETY WITH DEPRESSION: ICD-10-CM

## 2025-08-21 RX ORDER — HYDROXYZINE HYDROCHLORIDE 25 MG/1
25 TABLET, FILM COATED ORAL
Qty: 30 TABLET | Refills: 0 | Status: SHIPPED | OUTPATIENT
Start: 2025-08-21